# Patient Record
Sex: FEMALE | Race: WHITE | Employment: OTHER | URBAN - METROPOLITAN AREA
[De-identification: names, ages, dates, MRNs, and addresses within clinical notes are randomized per-mention and may not be internally consistent; named-entity substitution may affect disease eponyms.]

---

## 2022-10-11 ENCOUNTER — HOSPITAL ENCOUNTER (INPATIENT)
Age: 82
LOS: 3 days | Discharge: HOME HEALTH | DRG: 690 | End: 2022-10-14
Attending: STUDENT IN AN ORGANIZED HEALTH CARE EDUCATION/TRAINING PROGRAM | Admitting: INTERNAL MEDICINE
Payer: MEDICARE

## 2022-10-11 ENCOUNTER — APPOINTMENT (OUTPATIENT)
Dept: GENERAL RADIOLOGY | Age: 82
DRG: 690 | End: 2022-10-11
Payer: MEDICARE

## 2022-10-11 ENCOUNTER — APPOINTMENT (OUTPATIENT)
Dept: CT IMAGING | Age: 82
DRG: 690 | End: 2022-10-11
Payer: MEDICARE

## 2022-10-11 DIAGNOSIS — E87.6 HYPOKALEMIA: ICD-10-CM

## 2022-10-11 DIAGNOSIS — R55 SYNCOPE AND COLLAPSE: ICD-10-CM

## 2022-10-11 DIAGNOSIS — N39.0 URINARY TRACT INFECTION WITHOUT HEMATURIA, SITE UNSPECIFIED: Primary | ICD-10-CM

## 2022-10-11 DIAGNOSIS — I21.4 NSTEMI (NON-ST ELEVATED MYOCARDIAL INFARCTION) (HCC): ICD-10-CM

## 2022-10-11 DIAGNOSIS — I44.7 LBBB (LEFT BUNDLE BRANCH BLOCK): ICD-10-CM

## 2022-10-11 LAB
A/G RATIO: 1.4 (ref 1.1–2.2)
ALBUMIN SERPL-MCNC: 4 G/DL (ref 3.4–5)
ALP BLD-CCNC: 82 U/L (ref 40–129)
ALT SERPL-CCNC: 53 U/L (ref 10–40)
ANION GAP SERPL CALCULATED.3IONS-SCNC: 11 MMOL/L (ref 3–16)
AST SERPL-CCNC: 31 U/L (ref 15–37)
BACTERIA: ABNORMAL /HPF
BASOPHILS ABSOLUTE: 0 K/UL (ref 0–0.2)
BASOPHILS RELATIVE PERCENT: 0.7 %
BILIRUB SERPL-MCNC: 0.7 MG/DL (ref 0–1)
BILIRUBIN URINE: NEGATIVE
BLOOD, URINE: NEGATIVE
BUN BLDV-MCNC: 16 MG/DL (ref 7–20)
CALCIUM SERPL-MCNC: 9.4 MG/DL (ref 8.3–10.6)
CHLORIDE BLD-SCNC: 99 MMOL/L (ref 99–110)
CHP ED QC CHECK: NORMAL
CLARITY: CLEAR
CO2: 25 MMOL/L (ref 21–32)
COLOR: YELLOW
CREAT SERPL-MCNC: 0.6 MG/DL (ref 0.6–1.2)
EKG ATRIAL RATE: 91 BPM
EKG DIAGNOSIS: NORMAL
EKG P AXIS: 54 DEGREES
EKG P-R INTERVAL: 178 MS
EKG Q-T INTERVAL: 398 MS
EKG QRS DURATION: 122 MS
EKG QTC CALCULATION (BAZETT): 489 MS
EKG R AXIS: -52 DEGREES
EKG T AXIS: 95 DEGREES
EKG VENTRICULAR RATE: 91 BPM
EOSINOPHILS ABSOLUTE: 0.1 K/UL (ref 0–0.6)
EOSINOPHILS RELATIVE PERCENT: 1 %
GFR AFRICAN AMERICAN: >60
GFR NON-AFRICAN AMERICAN: >60
GLUCOSE BLD-MCNC: 114 MG/DL
GLUCOSE BLD-MCNC: 114 MG/DL (ref 70–99)
GLUCOSE BLD-MCNC: 115 MG/DL (ref 70–99)
GLUCOSE URINE: NEGATIVE MG/DL
HCT VFR BLD CALC: 38.9 % (ref 36–48)
HEMOGLOBIN: 13 G/DL (ref 12–16)
KETONES, URINE: NEGATIVE MG/DL
LEUKOCYTE ESTERASE, URINE: ABNORMAL
LYMPHOCYTES ABSOLUTE: 1.8 K/UL (ref 1–5.1)
LYMPHOCYTES RELATIVE PERCENT: 28.3 %
MAGNESIUM: 1.5 MG/DL (ref 1.8–2.4)
MCH RBC QN AUTO: 32 PG (ref 26–34)
MCHC RBC AUTO-ENTMCNC: 33.3 G/DL (ref 31–36)
MCV RBC AUTO: 96.1 FL (ref 80–100)
MICROSCOPIC EXAMINATION: YES
MONOCYTES ABSOLUTE: 0.5 K/UL (ref 0–1.3)
MONOCYTES RELATIVE PERCENT: 8.4 %
NEUTROPHILS ABSOLUTE: 3.8 K/UL (ref 1.7–7.7)
NEUTROPHILS RELATIVE PERCENT: 61.6 %
NITRITE, URINE: POSITIVE
PDW BLD-RTO: 14.1 % (ref 12.4–15.4)
PERFORMED ON: ABNORMAL
PH UA: 6.5 (ref 5–8)
PLATELET # BLD: 242 K/UL (ref 135–450)
PMV BLD AUTO: 8.7 FL (ref 5–10.5)
POTASSIUM REFLEX MAGNESIUM: 3.3 MMOL/L (ref 3.5–5.1)
PROTEIN UA: NEGATIVE MG/DL
RBC # BLD: 4.05 M/UL (ref 4–5.2)
RBC UA: ABNORMAL /HPF (ref 0–4)
SODIUM BLD-SCNC: 135 MMOL/L (ref 136–145)
SPECIFIC GRAVITY UA: 1.02 (ref 1–1.03)
TOTAL PROTEIN: 6.8 G/DL (ref 6.4–8.2)
TROPONIN: 0.03 NG/ML
TROPONIN: 0.03 NG/ML
URINE REFLEX TO CULTURE: ABNORMAL
URINE TYPE: ABNORMAL
UROBILINOGEN, URINE: 2 E.U./DL
WBC # BLD: 6.2 K/UL (ref 4–11)
WBC UA: ABNORMAL /HPF (ref 0–5)

## 2022-10-11 PROCEDURE — 83735 ASSAY OF MAGNESIUM: CPT

## 2022-10-11 PROCEDURE — 85025 COMPLETE CBC W/AUTO DIFF WBC: CPT

## 2022-10-11 PROCEDURE — 6360000002 HC RX W HCPCS: Performed by: STUDENT IN AN ORGANIZED HEALTH CARE EDUCATION/TRAINING PROGRAM

## 2022-10-11 PROCEDURE — 71045 X-RAY EXAM CHEST 1 VIEW: CPT

## 2022-10-11 PROCEDURE — 96367 TX/PROPH/DG ADDL SEQ IV INF: CPT

## 2022-10-11 PROCEDURE — 2060000000 HC ICU INTERMEDIATE R&B

## 2022-10-11 PROCEDURE — 93005 ELECTROCARDIOGRAM TRACING: CPT | Performed by: STUDENT IN AN ORGANIZED HEALTH CARE EDUCATION/TRAINING PROGRAM

## 2022-10-11 PROCEDURE — 70450 CT HEAD/BRAIN W/O DYE: CPT

## 2022-10-11 PROCEDURE — 84484 ASSAY OF TROPONIN QUANT: CPT

## 2022-10-11 PROCEDURE — 96365 THER/PROPH/DIAG IV INF INIT: CPT

## 2022-10-11 PROCEDURE — 99285 EMERGENCY DEPT VISIT HI MDM: CPT

## 2022-10-11 PROCEDURE — 72170 X-RAY EXAM OF PELVIS: CPT

## 2022-10-11 PROCEDURE — 6370000000 HC RX 637 (ALT 250 FOR IP): Performed by: STUDENT IN AN ORGANIZED HEALTH CARE EDUCATION/TRAINING PROGRAM

## 2022-10-11 PROCEDURE — 80053 COMPREHEN METABOLIC PANEL: CPT

## 2022-10-11 PROCEDURE — 2580000003 HC RX 258: Performed by: STUDENT IN AN ORGANIZED HEALTH CARE EDUCATION/TRAINING PROGRAM

## 2022-10-11 PROCEDURE — 81001 URINALYSIS AUTO W/SCOPE: CPT

## 2022-10-11 PROCEDURE — 36415 COLL VENOUS BLD VENIPUNCTURE: CPT

## 2022-10-11 RX ORDER — ACETAMINOPHEN 650 MG/1
650 SUPPOSITORY RECTAL EVERY 6 HOURS PRN
Status: DISCONTINUED | OUTPATIENT
Start: 2022-10-11 | End: 2022-10-14 | Stop reason: HOSPADM

## 2022-10-11 RX ORDER — MAGNESIUM SULFATE IN WATER 40 MG/ML
2000 INJECTION, SOLUTION INTRAVENOUS PRN
Status: DISCONTINUED | OUTPATIENT
Start: 2022-10-11 | End: 2022-10-14 | Stop reason: HOSPADM

## 2022-10-11 RX ORDER — ATORVASTATIN CALCIUM 40 MG/1
40 TABLET, FILM COATED ORAL NIGHTLY
COMMUNITY
End: 2022-10-14 | Stop reason: SDUPTHER

## 2022-10-11 RX ORDER — POTASSIUM CHLORIDE 7.45 MG/ML
10 INJECTION INTRAVENOUS PRN
Status: DISCONTINUED | OUTPATIENT
Start: 2022-10-11 | End: 2022-10-14 | Stop reason: HOSPADM

## 2022-10-11 RX ORDER — ONDANSETRON 2 MG/ML
4 INJECTION INTRAMUSCULAR; INTRAVENOUS EVERY 6 HOURS PRN
Status: DISCONTINUED | OUTPATIENT
Start: 2022-10-11 | End: 2022-10-14 | Stop reason: HOSPADM

## 2022-10-11 RX ORDER — SODIUM CHLORIDE 9 MG/ML
INJECTION, SOLUTION INTRAVENOUS PRN
Status: DISCONTINUED | OUTPATIENT
Start: 2022-10-11 | End: 2022-10-14 | Stop reason: HOSPADM

## 2022-10-11 RX ORDER — SODIUM CHLORIDE 0.9 % (FLUSH) 0.9 %
5-40 SYRINGE (ML) INJECTION PRN
Status: DISCONTINUED | OUTPATIENT
Start: 2022-10-11 | End: 2022-10-14 | Stop reason: HOSPADM

## 2022-10-11 RX ORDER — SODIUM CHLORIDE 0.9 % (FLUSH) 0.9 %
5-40 SYRINGE (ML) INJECTION EVERY 12 HOURS SCHEDULED
Status: DISCONTINUED | OUTPATIENT
Start: 2022-10-11 | End: 2022-10-14 | Stop reason: HOSPADM

## 2022-10-11 RX ORDER — ENOXAPARIN SODIUM 100 MG/ML
40 INJECTION SUBCUTANEOUS DAILY
Status: DISCONTINUED | OUTPATIENT
Start: 2022-10-12 | End: 2022-10-14 | Stop reason: HOSPADM

## 2022-10-11 RX ORDER — MAGNESIUM SULFATE IN WATER 40 MG/ML
2000 INJECTION, SOLUTION INTRAVENOUS ONCE
Status: COMPLETED | OUTPATIENT
Start: 2022-10-11 | End: 2022-10-11

## 2022-10-11 RX ORDER — POTASSIUM CHLORIDE 20 MEQ/1
40 TABLET, EXTENDED RELEASE ORAL ONCE
Status: COMPLETED | OUTPATIENT
Start: 2022-10-11 | End: 2022-10-11

## 2022-10-11 RX ORDER — ACETAMINOPHEN 325 MG/1
650 TABLET ORAL EVERY 6 HOURS PRN
Status: DISCONTINUED | OUTPATIENT
Start: 2022-10-11 | End: 2022-10-14 | Stop reason: HOSPADM

## 2022-10-11 RX ORDER — ONDANSETRON 4 MG/1
4 TABLET, ORALLY DISINTEGRATING ORAL EVERY 8 HOURS PRN
Status: DISCONTINUED | OUTPATIENT
Start: 2022-10-11 | End: 2022-10-14 | Stop reason: HOSPADM

## 2022-10-11 RX ORDER — POTASSIUM CHLORIDE 20 MEQ/1
40 TABLET, EXTENDED RELEASE ORAL PRN
Status: DISCONTINUED | OUTPATIENT
Start: 2022-10-11 | End: 2022-10-14 | Stop reason: HOSPADM

## 2022-10-11 RX ORDER — AMLODIPINE BESYLATE 10 MG/1
10 TABLET ORAL DAILY
COMMUNITY
End: 2022-10-14 | Stop reason: SDUPTHER

## 2022-10-11 RX ORDER — POLYETHYLENE GLYCOL 3350 17 G/17G
17 POWDER, FOR SOLUTION ORAL DAILY PRN
Status: DISCONTINUED | OUTPATIENT
Start: 2022-10-11 | End: 2022-10-14 | Stop reason: HOSPADM

## 2022-10-11 RX ADMIN — POTASSIUM CHLORIDE 40 MEQ: 1500 TABLET, EXTENDED RELEASE ORAL at 20:44

## 2022-10-11 RX ADMIN — DEXTROSE MONOHYDRATE 1000 MG: 5 INJECTION INTRAVENOUS at 17:18

## 2022-10-11 RX ADMIN — MAGNESIUM SULFATE HEPTAHYDRATE 2000 MG: 40 INJECTION, SOLUTION INTRAVENOUS at 20:45

## 2022-10-11 ASSESSMENT — ENCOUNTER SYMPTOMS
CHEST TIGHTNESS: 0
NAUSEA: 0
SORE THROAT: 0
SHORTNESS OF BREATH: 0
VOMITING: 0
ABDOMINAL PAIN: 0

## 2022-10-11 ASSESSMENT — PAIN - FUNCTIONAL ASSESSMENT
PAIN_FUNCTIONAL_ASSESSMENT: NONE - DENIES PAIN
PAIN_FUNCTIONAL_ASSESSMENT: NONE - DENIES PAIN

## 2022-10-11 NOTE — ED TRIAGE NOTES
Pt presents to ED via EMS for repeated falls at [facility]. Per nursing staff, pt was dizzy upon standing and has had 2 falls in the past 3 days.

## 2022-10-11 NOTE — ED PROVIDER NOTES
[address] [facility]          ATTENDING PHYSICIAN NOTE       Date of evaluation: 10/11/2022    Chief Complaint     Fall (Per [facility], falls x2 over the past 3 days. )      History of Present Illness     Zeferino Oshea is a 80 y.o. female with a history of dementia, hypothyroidism who presents to the ED today for evaluation of falls. The patient states that she has had 2 falls in the past 24 hours with an episode of syncope noted on her first fall. On the most recent fall the patient did not hit her head. She is not on any blood thinners. She denies any chest pain, shortness of breath, abdominal pain, back pain. The patient is under an involuntary hold at this time from [facility]. She states that she has noticed that she is having some issues with her gait and has been tripping as well. She has had a history of previous urinary tract infections. She does state that she has generalized malaise. She has not had any vomiting. Looking at her outpatient labs he does not appear that she is on any anticoagulant medications. Review of Systems     Review of Systems   Constitutional:  Positive for fatigue. Negative for chills and fever. HENT:  Negative for congestion and sore throat. Respiratory:  Negative for chest tightness and shortness of breath. Cardiovascular:  Negative for chest pain and leg swelling. Gastrointestinal:  Negative for abdominal pain, nausea and vomiting. Genitourinary:  Negative for difficulty urinating, flank pain and pelvic pain. Musculoskeletal:  Negative for gait problem and neck pain. Skin:  Negative for wound. Neurological:  Negative for dizziness, syncope, weakness and light-headedness. Hematological:  Negative for adenopathy. Psychiatric/Behavioral:  Negative for confusion.       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 Past surgical history: No known at this time    Medications     Previous Medications    No medications on file       Allergies     She has No Known Allergies. Physical Exam     INITIAL VITALS: BP: 121/64, Temp: 98.2 °F (36.8 °C), Heart Rate: 93, Resp: 14, SpO2: 92 %   Physical Exam  Vitals and nursing note reviewed. Constitutional:       Appearance: Normal appearance. She is well-developed. HENT:      Head: Normocephalic and atraumatic. Eyes:      Pupils: Pupils are equal, round, and reactive to light. Cardiovascular:      Rate and Rhythm: Normal rate and regular rhythm. Pulmonary:      Effort: Pulmonary effort is normal.      Breath sounds: Normal breath sounds. Abdominal:      General: Abdomen is flat. Palpations: Abdomen is soft. Musculoskeletal:         General: No swelling, tenderness, deformity or signs of injury. Normal range of motion. Cervical back: Neck supple. Right lower leg: No edema. Left lower leg: No edema. Skin:     General: Skin is warm and dry. Capillary Refill: Capillary refill takes less than 2 seconds. Findings: No erythema. Neurological:      Mental Status: She is alert and oriented to person, place, and time. Cranial Nerves: No cranial nerve deficit. Coordination: Coordination normal.       Diagnostic Results     EKG   Indication syncope. Heart rate 91. Normal sinus rhythm with left bundle branch block. QTc 489 ms. Left axis deviation. No previous EKG available. RADIOLOGY:  XR PELVIS (1-2 VIEWS)   Final Result      Mild bilateral hip osteoarthrosis with no acute osseous abnormality. Lower lumbar spondylosis. XR CHEST 1 VIEW   Final Result      No acute radiographic abnormality of the chest.      CT Head W/O Contrast   Final Result      No acute intracranial hemorrhage or mass effect. Mild chronic small vessel ischemic change.           LABS: Results for orders placed or performed during the hospital encounter of 10/11/22   CBC with Auto Differential   Result Value Ref Range    WBC 6.2 4.0 - 11.0 K/uL    RBC 4.05 4.00 - 5.20 M/uL    Hemoglobin 13.0 12.0 - 16.0 g/dL    Hematocrit 38.9 36.0 - 48.0 %    MCV 96.1 80.0 - 100.0 fL    MCH 32.0 26.0 - 34.0 pg    MCHC 33.3 31.0 - 36.0 g/dL    RDW 14.1 12.4 - 15.4 %    Platelets 968 780 - 379 K/uL    MPV 8.7 5.0 - 10.5 fL    Neutrophils % 61.6 %    Lymphocytes % 28.3 %    Monocytes % 8.4 %    Eosinophils % 1.0 %    Basophils % 0.7 %    Neutrophils Absolute 3.8 1.7 - 7.7 K/uL    Lymphocytes Absolute 1.8 1.0 - 5.1 K/uL    Monocytes Absolute 0.5 0.0 - 1.3 K/uL    Eosinophils Absolute 0.1 0.0 - 0.6 K/uL    Basophils Absolute 0.0 0.0 - 0.2 K/uL   CMP w/ Reflex to MG   Result Value Ref Range    Sodium 135 (L) 136 - 145 mmol/L    Potassium reflex Magnesium 3.3 (L) 3.5 - 5.1 mmol/L    Chloride 99 99 - 110 mmol/L    CO2 25 21 - 32 mmol/L    Anion Gap 11 3 - 16    Glucose 115 (H) 70 - 99 mg/dL    BUN 16 7 - 20 mg/dL    Creatinine 0.6 0.6 - 1.2 mg/dL    GFR Non-African American >60 >60    GFR African American >60 >60    Calcium 9.4 8.3 - 10.6 mg/dL    Total Protein 6.8 6.4 - 8.2 g/dL    Albumin 4.0 3.4 - 5.0 g/dL    Albumin/Globulin Ratio 1.4 1.1 - 2.2    Total Bilirubin 0.7 0.0 - 1.0 mg/dL    Alkaline Phosphatase 82 40 - 129 U/L    ALT 53 (H) 10 - 40 U/L    AST 31 15 - 37 U/L   Troponin   Result Value Ref Range    Troponin 0.03 (H) <0.01 ng/mL   Urinalysis with Reflex to Culture    Specimen: Urine   Result Value Ref Range    Color, UA Yellow Straw/Yellow    Clarity, UA Clear Clear    Glucose, Ur Negative Negative mg/dL    Bilirubin Urine Negative Negative    Ketones, Urine Negative Negative mg/dL    Specific Gravity, UA 1.020 1.005 - 1.030    Blood, Urine Negative Negative    pH, UA 6.5 5.0 - 8.0    Protein, UA Negative Negative mg/dL    Urobilinogen, Urine 2.0 (A) <2.0 E.U./dL    Nitrite, Urine POSITIVE (A) Negative Leukocyte Esterase, Urine SMALL (A) Negative    Microscopic Examination YES     Urine Type Voided     Urine Reflex to Culture Not Indicated    Magnesium   Result Value Ref Range    Magnesium 1.50 (L) 1.80 - 2.40 mg/dL   Troponin   Result Value Ref Range    Troponin 0.03 (H) <0.01 ng/mL   Microscopic Urinalysis   Result Value Ref Range    WBC, UA 6-9 (A) 0 - 5 /HPF    RBC, UA 0-2 0 - 4 /HPF    Bacteria, UA 4+ (A) None Seen /HPF   POCT Glucose   Result Value Ref Range    Glucose 114 mg/dL    QC OK? ok    POCT Glucose   Result Value Ref Range    POC Glucose 114 (H) 70 - 99 mg/dl    Performed on ACCU-CHEK    EKG 12 Lead   Result Value Ref Range    Ventricular Rate 91 BPM    Atrial Rate 91 BPM    P-R Interval 178 ms    QRS Duration 122 ms    Q-T Interval 398 ms    QTc Calculation (Bazett) 489 ms    P Axis 54 degrees    R Axis -52 degrees    T Axis 95 degrees    Diagnosis       EKG performed in ER and to be interpreted by ER physician. Confirmed by MD, ER ([id number]),  Chalo Trinh ([id number]) on 10/11/2022 4:37:37 PM       ED BEDSIDE ULTRASOUND:  No results found. RECENT VITALS:  BP: 134/75,Temp: 97.7 °F (36.5 °C), Heart Rate: 93, Resp: 15, SpO2: 96 %     Procedures       ED Course     Nursing Notes, Past Medical Hx, Past Surgical Hx, Social Hx,Allergies, and Family Hx were reviewed. Looking at previous notes it appears the patient was admitted 4 days ago to [facility] at that time she was found wandering away from home and was having some paranoia. She had had some suicidal ideation at that time. She came through [facility]. ED Course as of 10/11/22 2122   Tue Oct 11, 2022   1515 Patient's cross-sectional imaging studies at this time otherwise unremarkable. This included CT scan of her head and her EKG which here shows left bundle branch block with a left axis deviation. No criteria for underlying myocardial injury.  [EI]   [phone] I was able to get a hold of the facility and they stated the patient had a mechanical fall today landing on her buttocks. She was complaining of some pain in her pelvis. They had just recently obtained a urinalysis yesterday which was pending at this time. [EI]   0295 The patient's pelvis at this time shows no evidence of any fractures aside from some osteoarthritis. [EI]   1721 She had no previous EKG for us to compare to here. Her initial troponin was 0.03 which is unchanged on repeat. She was noted to have a urinary tract infection was given a dose of Rocephin here in the department. Her renal panel shows some mild hypokalemia as well and no evidence of any TIMOTHY. [EI]      ED Course User Index  [EI] Nicole Rdz MD       patient was given the following medications:  Orders Placed This Encounter   Medications    cefTRIAXone (ROCEPHIN) 1,000 mg in dextrose 5 % 50 mL IVPB mini-bag     Order Specific Question:   Antimicrobial Indications     Answer:   Urinary Tract Infection    potassium chloride (KLOR-CON M) extended release tablet 40 mEq    magnesium sulfate 2000 mg in 50 mL IVPB premix       CONSULTS:  None    MEDICAL DECISIONMAKING / ASSESSMENT / Nazia Katelin is a 80 y.o. female with a history of heart murmur of unknown etiology, hypertension, hypothyroidism who had an episode of syncope and is also noted to have a urinary tract infection here in the department. Patient has troponin of 0.03 that is unchanged on repeat but given her constellation of symptoms will be admitted for cardiac work-up and treatment for urinary tract infection. Clinical Impression     1. Urinary tract infection without hematuria, site unspecified    2. NSTEMI (non-ST elevated myocardial infarction) ([facility] [address]    3. Syncope and collapse    4. LBBB (left bundle branch block)    5. Hypokalemia        Disposition     PATIENT REFERRED TO:  No follow-up provider specified.     DISCHARGE MEDICATIONS:  New Prescriptions    No medications on file       DISPOSITION Decision To Admit 10/11/2022 05:18:23 PM         Cyrus Medel MD  10/11/22 212

## 2022-10-12 PROBLEM — I44.7 LBBB (LEFT BUNDLE BRANCH BLOCK): Status: ACTIVE | Noted: 2022-10-12
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
## 2022-10-12 LAB
ANION GAP SERPL CALCULATED.3IONS-SCNC: 11 MMOL/L (ref 3–16)
BASOPHILS ABSOLUTE: 0 K/UL (ref 0–0.2)
BASOPHILS RELATIVE PERCENT: 0.7 %
BUN BLDV-MCNC: 13 MG/DL (ref 7–20)
CALCIUM SERPL-MCNC: 9.7 MG/DL (ref 8.3–10.6)
CHLORIDE BLD-SCNC: 99 MMOL/L (ref 99–110)
CO2: 25 MMOL/L (ref 21–32)
CREAT SERPL-MCNC: 0.5 MG/DL (ref 0.6–1.2)
EOSINOPHILS ABSOLUTE: 0.1 K/UL (ref 0–0.6)
EOSINOPHILS RELATIVE PERCENT: 1 %
GFR AFRICAN AMERICAN: >60
GFR NON-AFRICAN AMERICAN: >60
GLUCOSE BLD-MCNC: 108 MG/DL (ref 70–99)
HCT VFR BLD CALC: 38 % (ref 36–48)
HEMOGLOBIN: 12.9 G/DL (ref 12–16)
LYMPHOCYTES ABSOLUTE: 1.3 K/UL (ref 1–5.1)
LYMPHOCYTES RELATIVE PERCENT: 19.5 %
MCH RBC QN AUTO: 32.3 PG (ref 26–34)
MCHC RBC AUTO-ENTMCNC: 33.9 G/DL (ref 31–36)
MCV RBC AUTO: 95.3 FL (ref 80–100)
MONOCYTES ABSOLUTE: 0.5 K/UL (ref 0–1.3)
MONOCYTES RELATIVE PERCENT: 8 %
NEUTROPHILS ABSOLUTE: 4.8 K/UL (ref 1.7–7.7)
NEUTROPHILS RELATIVE PERCENT: 70.8 %
PDW BLD-RTO: 13.7 % (ref 12.4–15.4)
PLATELET # BLD: 223 K/UL (ref 135–450)
PMV BLD AUTO: 9 FL (ref 5–10.5)
POTASSIUM REFLEX MAGNESIUM: 4 MMOL/L (ref 3.5–5.1)
RBC # BLD: 3.99 M/UL (ref 4–5.2)
SODIUM BLD-SCNC: 135 MMOL/L (ref 136–145)
TROPONIN: 0.01 NG/ML
TROPONIN: 0.02 NG/ML
WBC # BLD: 6.8 K/UL (ref 4–11)

## 2022-10-12 PROCEDURE — 2580000003 HC RX 258: Performed by: INTERNAL MEDICINE

## 2022-10-12 PROCEDURE — 97116 GAIT TRAINING THERAPY: CPT

## 2022-10-12 PROCEDURE — 6370000000 HC RX 637 (ALT 250 FOR IP): Performed by: INTERNAL MEDICINE

## 2022-10-12 PROCEDURE — 85025 COMPLETE CBC W/AUTO DIFF WBC: CPT

## 2022-10-12 PROCEDURE — 93005 ELECTROCARDIOGRAM TRACING: CPT | Performed by: INTERNAL MEDICINE

## 2022-10-12 PROCEDURE — 6360000002 HC RX W HCPCS: Performed by: INTERNAL MEDICINE

## 2022-10-12 PROCEDURE — 97530 THERAPEUTIC ACTIVITIES: CPT

## 2022-10-12 PROCEDURE — 36415 COLL VENOUS BLD VENIPUNCTURE: CPT

## 2022-10-12 PROCEDURE — 97166 OT EVAL MOD COMPLEX 45 MIN: CPT

## 2022-10-12 PROCEDURE — 2060000000 HC ICU INTERMEDIATE R&B

## 2022-10-12 PROCEDURE — 80048 BASIC METABOLIC PNL TOTAL CA: CPT

## 2022-10-12 PROCEDURE — 97162 PT EVAL MOD COMPLEX 30 MIN: CPT

## 2022-10-12 PROCEDURE — 84484 ASSAY OF TROPONIN QUANT: CPT

## 2022-10-12 PROCEDURE — 99221 1ST HOSP IP/OBS SF/LOW 40: CPT | Performed by: INTERNAL MEDICINE

## 2022-10-12 PROCEDURE — 97535 SELF CARE MNGMENT TRAINING: CPT

## 2022-10-12 RX ORDER — LABETALOL 200 MG/1
100 TABLET, FILM COATED ORAL 2 TIMES DAILY
COMMUNITY
End: 2022-10-14 | Stop reason: SDUPTHER

## 2022-10-12 RX ORDER — DULOXETIN HYDROCHLORIDE 30 MG/1
30 CAPSULE, DELAYED RELEASE ORAL EVERY EVENING
COMMUNITY
End: 2022-10-14 | Stop reason: SDUPTHER

## 2022-10-12 RX ORDER — LEVOTHYROXINE SODIUM 0.05 MG/1
50 TABLET ORAL DAILY
Status: DISCONTINUED | OUTPATIENT
Start: 2022-10-12 | End: 2022-10-14 | Stop reason: HOSPADM

## 2022-10-12 RX ORDER — POTASSIUM CHLORIDE 20 MEQ/1
20 TABLET, EXTENDED RELEASE ORAL 2 TIMES DAILY
Status: ON HOLD | COMMUNITY
Start: 2022-10-07 | End: 2022-10-14 | Stop reason: HOSPADM

## 2022-10-12 RX ORDER — RISPERIDONE 0.25 MG/1
0.25 TABLET, FILM COATED ORAL 2 TIMES DAILY
Status: ON HOLD | COMMUNITY
End: 2022-10-14 | Stop reason: HOSPADM

## 2022-10-12 RX ORDER — TRAZODONE HYDROCHLORIDE 50 MG/1
25 TABLET ORAL
Status: DISCONTINUED | OUTPATIENT
Start: 2022-10-12 | End: 2022-10-14 | Stop reason: HOSPADM

## 2022-10-12 RX ORDER — AMLODIPINE BESYLATE 10 MG/1
10 TABLET ORAL DAILY
Status: DISCONTINUED | OUTPATIENT
Start: 2022-10-12 | End: 2022-10-14 | Stop reason: HOSPADM

## 2022-10-12 RX ORDER — MAGNESIUM HYDROXIDE/ALUMINUM HYDROXICE/SIMETHICONE 120; 1200; 1200 MG/30ML; MG/30ML; MG/30ML
30 SUSPENSION ORAL EVERY 4 HOURS PRN
COMMUNITY
End: 2022-10-14 | Stop reason: SDUPTHER

## 2022-10-12 RX ORDER — HYDROCHLOROTHIAZIDE 25 MG/1
25 TABLET ORAL DAILY
Status: ON HOLD | COMMUNITY
End: 2022-10-14 | Stop reason: HOSPADM

## 2022-10-12 RX ORDER — ACETAMINOPHEN 325 MG/1
650 TABLET ORAL EVERY 6 HOURS PRN
COMMUNITY

## 2022-10-12 RX ORDER — LABETALOL 100 MG/1
100 TABLET, FILM COATED ORAL 2 TIMES DAILY
Status: DISCONTINUED | OUTPATIENT
Start: 2022-10-12 | End: 2022-10-14 | Stop reason: HOSPADM

## 2022-10-12 RX ORDER — TRAZODONE HYDROCHLORIDE 50 MG/1
25 TABLET ORAL
COMMUNITY
End: 2022-10-14 | Stop reason: SDUPTHER

## 2022-10-12 RX ORDER — RISPERIDONE 0.25 MG/1
0.25 TABLET, FILM COATED ORAL 2 TIMES DAILY
Status: DISCONTINUED | OUTPATIENT
Start: 2022-10-12 | End: 2022-10-14 | Stop reason: HOSPADM

## 2022-10-12 RX ORDER — DULOXETIN HYDROCHLORIDE 30 MG/1
30 CAPSULE, DELAYED RELEASE ORAL EVERY EVENING
Status: DISCONTINUED | OUTPATIENT
Start: 2022-10-12 | End: 2022-10-14 | Stop reason: HOSPADM

## 2022-10-12 RX ORDER — ACETAMINOPHEN 325 MG/1
650 TABLET ORAL EVERY 6 HOURS PRN
Status: DISCONTINUED | OUTPATIENT
Start: 2022-10-12 | End: 2022-10-12 | Stop reason: SDUPTHER

## 2022-10-12 RX ORDER — ATORVASTATIN CALCIUM 40 MG/1
40 TABLET, FILM COATED ORAL NIGHTLY
Status: DISCONTINUED | OUTPATIENT
Start: 2022-10-12 | End: 2022-10-14 | Stop reason: HOSPADM

## 2022-10-12 RX ORDER — LISINOPRIL 40 MG/1
40 TABLET ORAL DAILY
COMMUNITY
End: 2022-10-14 | Stop reason: SDUPTHER

## 2022-10-12 RX ORDER — LISINOPRIL 40 MG/1
40 TABLET ORAL DAILY
Status: DISCONTINUED | OUTPATIENT
Start: 2022-10-12 | End: 2022-10-14 | Stop reason: HOSPADM

## 2022-10-12 RX ORDER — TERAZOSIN 5 MG/1
10 CAPSULE ORAL DAILY
Status: ON HOLD | COMMUNITY
End: 2022-10-14 | Stop reason: HOSPADM

## 2022-10-12 RX ORDER — LEVOTHYROXINE SODIUM 0.05 MG/1
50 TABLET ORAL DAILY
COMMUNITY
End: 2022-10-14 | Stop reason: SDUPTHER

## 2022-10-12 RX ADMIN — DULOXETINE HYDROCHLORIDE 30 MG: 30 CAPSULE, DELAYED RELEASE ORAL at 17:08

## 2022-10-12 RX ADMIN — SODIUM CHLORIDE, PRESERVATIVE FREE 10 ML: 5 INJECTION INTRAVENOUS at 00:21

## 2022-10-12 RX ADMIN — LABETALOL HYDROCHLORIDE 100 MG: 100 TABLET, FILM COATED ORAL at 21:11

## 2022-10-12 RX ADMIN — SODIUM CHLORIDE, PRESERVATIVE FREE 10 ML: 5 INJECTION INTRAVENOUS at 21:10

## 2022-10-12 RX ADMIN — LEVOTHYROXINE SODIUM 50 MCG: 50 TABLET ORAL at 15:13

## 2022-10-12 RX ADMIN — DEXTROSE MONOHYDRATE 1000 MG: 5 INJECTION INTRAVENOUS at 17:08

## 2022-10-12 RX ADMIN — AMLODIPINE BESYLATE 10 MG: 10 TABLET ORAL at 15:14

## 2022-10-12 RX ADMIN — SODIUM CHLORIDE, PRESERVATIVE FREE 10 ML: 5 INJECTION INTRAVENOUS at 11:05

## 2022-10-12 RX ADMIN — ATORVASTATIN CALCIUM 40 MG: 40 TABLET, FILM COATED ORAL at 21:11

## 2022-10-12 RX ADMIN — ENOXAPARIN SODIUM 40 MG: 100 INJECTION SUBCUTANEOUS at 11:05

## 2022-10-12 ASSESSMENT — PAIN SCALES - GENERAL
PAINLEVEL_OUTOF10: 0

## 2022-10-12 NOTE — PROGRESS NOTES
Hospital Medicine Care  Progress Note      Chief complain; Fall (Per [facility], falls x2 over the past 3 days. )  80-year-old female with a past medical history of dementia, recently admitted to inpatient psych facility [facility]), presented with syncopal event         Subjective:     Patient able to state her name and date of birth  Denies any chest pain, shortness of breath  Denies any lightheadedness  Disoriented      Review of Systems:     Review of Systems as mentioned above, other ros is negative. Objective:       Medications        Scheduled Meds:   sodium chloride flush  5-40 mL IntraVENous 2 times per day    enoxaparin  40 mg SubCUTAneous Daily    cefTRIAXone (ROCEPHIN) IV  1,000 mg IntraVENous Q24H     Continuous Infusions:   sodium chloride       PRN Meds:.perflutren lipid microspheres, sodium chloride flush, sodium chloride, ondansetron **OR** ondansetron, polyethylene glycol, acetaminophen **OR** acetaminophen, potassium chloride **OR** potassium alternative oral replacement **OR** potassium chloride, magnesium sulfate      Allergies  No Known Allergies      Vitals:    10/12/22 0406 10/12/22 0600 10/12/22 0827 10/12/22 1108   BP: (!) 141/68  104/63 (!) 143/77   Pulse: 79 93 83 90   Resp: 20 20 17 16   Temp: 97.7 °F (36.5 °C)  98 °F (36.7 °C) 97.6 °F (36.4 °C)   TempSrc: Oral  Oral Oral   SpO2: 96%  97% 98%   Weight: 131 lb 2.8 oz (59.5 kg)      Height:             Physical exam:         Physical Exam  Constitutional:       General: She is not in acute distress. Appearance: Normal appearance. She is ill-appearing. HENT:      Head: Normocephalic and atraumatic. Cardiovascular:      Rate and Rhythm: Normal rate and regular rhythm. Pulses: Normal pulses. Heart sounds: Normal heart sounds. Pulmonary:      Effort: Pulmonary effort is normal.      Breath sounds: Normal breath sounds. Skin:     General: Skin is warm and dry. Neurological:      General: No focal deficit present.

## 2022-10-12 NOTE — PROGRESS NOTES
Physical Therapy  Facility/Department: [facility]  Physical Therapy Initial Assessment    Name: Dom Juarez  : 1940  MRN: [id number]  Date of Service: 10/12/2022    Discharge Recommendations:Veronica Gil scored a 20/24 on the AM-PAC short mobility form. Current research shows that an AM-PAC score of 18 or greater is typically associated with a discharge to the patient's home setting. Based on the patient's AM-PAC score and their current functional mobility deficits, it is recommended that the patient have 2-3 sessions per week of Physical Therapy at d/c to increase the patient's independence. At this time, this patient demonstrates the endurance and safety to discharge home with (home vs OP services) and a follow up treatment frequency of 2-3x/wk. Please see assessment section for further patient specific details. If patient discharges prior to next session this note will serve as a discharge summary. Please see below for the latest assessment towards goals. PT Equipment Recommendations  Equipment Needed: No      Patient Diagnosis(es): The primary encounter diagnosis was Urinary tract infection without hematuria, site unspecified. Diagnoses of NSTEMI (non-ST elevated myocardial infarction) ([facility] [address] Syncope and collapse, LBBB (left bundle branch block), and Hypokalemia were also pertinent to this visit. Past Medical History:  has no past medical history on file. Past Surgical History:  has no past surgical history on file. Assessment   Assessment: 79 yo admitted 10/11/22 from [facility]. Pt confused this am and unable to provide history though she was aware she came from another facility. Per notes, pt found wandering away from home and admit to [facility]. Pt moving well but safety concerns for living alone due to long and short term memory deficits. Pt would benefit from 24 hour supervision at discharge, continued PT services.  Recommend nursing continue to encourage ambulation PRN and often while pt in hospital. RN aware. Treatment Diagnosis: mobility impairment due to syncope  Decision Making: High Complexity  Requires PT Follow-Up: Yes  Activity Tolerance  Activity Tolerance: Treatment limited secondary to decreased cognition;Patient tolerated treatment well     Plan   Physcial Therapy Plan  General Plan:  (2-5)  Current Treatment Recommendations: Transfer training, Gait training, Functional mobility training  Safety Devices  Type of Devices: Call light within reach, Chair alarm in place, Left in chair, Nurse notified, Sitter present (pt reclined)     Restrictions  Position Activity Restriction  Other position/activity restrictions: up with assist     Subjective   Pain: denies c/o  General  Additional Pertinent Hx: 80 y.o. female with a history of dementia, hypothyroidism who presents to the ED today for evaluation of falls/syncope. CXR/pelvic CT/head CT neg; cardio consult; positive NSTEMI; UA positive UTI Pmhx: patient was admitted 4 days ago to [facility] at that time she was found wandering away from home and was having some paranoia,  HTN, heart murmur, dementia. Family / Caregiver Present: No  Diagnosis: syncope/falls/UTI  Follows Commands: Within Functional Limits  Subjective  Subjective: Pt found supine in bed and agreeable to PT with encouragement. Pt denies pain. Social/Functional History  Social/Functional History  Lives With: Alone  Type of Home: House (reports she was thrown out of her rental - unsure of timeline)  Ambulation Assistance: Independent  Additional Comments: admitted from [facility] - found wandering in the streets and has been running away from her son. Pt is a poor historian and unable to provide meaningful history.     Vision/Hearing  Vision  Vision: Impaired  Vision Exceptions: Wears glasses at all times  Hearing  Hearing: Within functional limits      Cognition   Orientation  Overall Orientation Status: Impaired  Orientation Level: Oriented to place;Oriented to person  Cognition  Overall Cognitive Status: Exceptions  Arousal/Alertness: Delayed responses to stimuli  Following Commands: Follows one step commands with increased time  Attention Span: Attends with cues to redirect  Memory: Decreased short term memory;Decreased long term memory  Safety Judgement: Decreased awareness of need for assistance;Decreased awareness of need for safety  Problem Solving: Decreased awareness of errors  Insights: Decreased awareness of deficits  Initiation: Requires cues for some  Sequencing: Requires cues for some  Cognition Comment: Pt with flat affect. Objective                 AROM RLE (degrees)  RLE AROM: WFL  AROM LLE (degrees)  LLE AROM : WFL    Strength RLE  Strength RLE: WFL  Strength LLE  Strength LLE: WFL           Bed mobility  Sit to Supine: Supervision  Scooting: Supervision    Transfers  Sit to Stand: Stand by assistance (x3 trials)  Stand to Sit: Stand by assistance (x3 trials)    Ambulation  Device: No Device  Assistance: Contact guard assistance (progressing to SBA)  Quality of Gait: forward posture with decreased step lengt/height; pt reaching out for UE support during first trial then did not need to on subsequent trials  Distance: 10 ft x2, 100 ft  Comments: B/P: supine 127/69, sit 119/58, stand 112/71. RN and MD aware.           AM-PAC Score  AM-PAC Inpatient Mobility Raw Score : 20 (10/12/22 1043)  AM-PAC Inpatient T-Scale Score : 47.67 (10/12/22 1043)  Mobility Inpatient CMS 0-100% Score: 35.83 (10/12/22 1043)  Mobility Inpatient CMS G-Code Modifier : CJ (10/12/22 1043)         Goals  Short Term Goals  Time Frame for Short Term Goals: discharge  Short Term Goal 1: sit to/from stand independent  Short Term Goal 2: ambulate 300 ft independent  Patient Goals   Patient Goals : unable to state       Education  Patient Education  Education Given To: Patient  Education Provided: Role of Therapy  Education Method: Verbal  Barriers to Learning: Cognition  Education Outcome: Unable to verbalize      Therapy Time   Individual Concurrent Group Co-treatment   Time In 0950         Time Out 1030         Minutes 40          Timed Code Treatment Minutes: 30      Total Treatment Minutes:   40       Peng Gilman, PT

## 2022-10-12 NOTE — CARE COORDINATION
Case Management Assessment  Initial Evaluation    Date/Time of Evaluation: 10/12/2022 2:41 PM  Assessment Completed by: Mindy Rutledge RN    If patient is discharged prior to next notation, then this note serves as note for discharge by case management. Patient Name: Jose Connolly                   YOB: 1940  Diagnosis: Syncope and collapse [R55]  Hypokalemia [E87.6]  LBBB (left bundle branch block) [I44.7]  NSTEMI (non-ST elevated myocardial infarction) ([facility] [address] [I21.4]  Urinary tract infection without hematuria, site unspecified [N39.0]  Syncope, unspecified syncope type [R55]                   Date / Time: 10/11/2022  1:52 PM    Patient Admission Status: Inpatient   Readmission Risk (Low < 19, Mod (19-27), High > 27): Readmission Risk Score: 10.7    Current PCP: No primary care provider on file. PCP verified by CM? No    Chart Reviewed: Yes      History Provided by: Child/Family  Patient Orientation: Alert and Oriented, Person    Patient Cognition: Short Term Memory Deficit    Hospitalization in the last 30 days (Readmission):  No    If yes, Readmission Assessment in CM Navigator will be completed. Advance Directives:      Code Status: Full Code   Patient's Primary Decision Maker is:        Discharge Planning:    Patient lives with: Children Type of Home: House  Primary Care Giver: Family  Patient Support Systems include: Children   Current Financial resources:    Current community resources:    Current services prior to admission: Other (Comment) (at [facility])            Current DME:              Type of Home Care services:  None    ADLS  Prior functional level: Independent in ADLs/IADLs  Current functional level: Assistance with the following:    PT AM-PAC: 20 /24  OT AM-PAC: 18 /24    Family can provide assistance at DC: Yes  Would you like Case Management to discuss the discharge plan with any other family members/significant others, and if so, who?  Yes (son Elsy Herrera)  Plans to Return to Present Housing: Unknown at present  Other Identified Issues/Barriers to RETURNING to current housing: yes  Potential Assistance needed at discharge: Home Care            Potential DME:    Patient expects to discharge to: Behavioral Health/Substance/Detox  Plan for transportation at discharge: Family    Financial    Payor: Chencho Berkowitz / Plan: Arlette Mendez ESSENTIAL/PLUS / Product Type: *No Product type* /     Does insurance require precert for SNF: Yes    Potential assistance Purchasing Medications:    Meds-to-Beds request:      No Pharmacies Listed    Notes:    Factors facilitating achievement of predicted outcomes: Family support    Barriers to discharge: Medical complications and Medication managment    Additional Case Management Notes: Pt is from home alone, became depressed and agitated, went to daugther's home [facility]) and then ran away, ended up in hospital in [place]) . From there she was transferred to [facility] and then to [facility] for Syncope. Son states pt was Independent , driving, no DME prior to original hospital admission. Discharge plan [facility] vs home with daughter and Marsha Kessler. The Plan for Transition of Care is related to the following treatment goals of Syncope and collapse [R55]  Hypokalemia [E87.6]  LBBB (left bundle branch block) [I44.7]  NSTEMI (non-ST elevated myocardial infarction) ([facility] [address] [I21.4]  Urinary tract infection without hematuria, site unspecified [N39.0]  Syncope, unspecified syncope type [Q71]    IF APPLICABLE: The Patient and/or patient representative Tiny and her family were provided with a choice of provider and agrees with the discharge plan. Freedom of choice list with basic dialogue that supports the patient's individualized plan of care/goals and shares the quality data associated with the providers was provided to:     Patient Representative Name:       The Patient and/or Patient Representative Agree with the Discharge Plan?       Yolanda Nina RN  Case Management Department  Ph: [phone] Fax: [phone]

## 2022-10-12 NOTE — H&P
Hospital Medicine History & Physical      PCP: No primary care provider on file. Date of Admission: 10/11/2022    Date of Service: Pt seen/examined on 10/11/2022 and Admitted to Inpatient with expected LOS greater than two midnights due to medical therapy. Chief Complaint: Syncope and fall      History Of Present Illness:   80 y.o. female who presents from [facility]/psychiatric facility for further evaluation of falls/syncope. This patient has been admitted to [facility] as she was found wandering in the streets and has been running away from her son. Patient has history of dementia at baseline. Patient has had a fall with loss of consciousness-once yesterday at the facility. Patient had another fall, which is described as nonmechanical per facility staff-once without loss of consciousness. Patient is unable to participate in history giving due to baseline dementia. Patient has a sitter at the bedside as she is prone to wandering. Patient has not had any head injury during these 2 falls as she was held. She does not take any antiplatelets or blood thinners. Patient has history of frequent UTIs in the past    Past Medical History:      No past medical history on file. Unable to obtain due to baseline dementia    Past Surgical History:      No past surgical history on file. Unable to obtain due to baseline dementia    Medications Prior to Admission:      Prior to Admission medications    Not on File       Allergies:  Patient has no known allergies. Social History:      The patient currently lives at [address] [place] [address] [place]:   has no history on file for tobacco use. ETOH:   has no history on file for alcohol use. E-cigarette/Vaping       Questions Responses    E-cigarette/Vaping Use     Start Date     Passive Exposure     Quit Date     Counseling Given     Comments               Family History:    Reviewed and negative in regards to presenting illness/complaint.   Unable to obtain due to baseline dementia    REVIEW OF SYSTEMS COMPLETED:    Patient is unable to provide history due to baseline dementia    PHYSICAL EXAM PERFORMED:    /75   Pulse 93   Temp 97.7 °F (36.5 °C) (Oral)   Resp 15   Ht 5' 2\" (1.575 m)   Wt 137 lb 3.2 oz (62.2 kg)   SpO2 96%   BMI 25.09 kg/m²     General appearance:  No apparent distress, appears stated age and cooperative. HEENT:  Normal cephalic, atraumatic without obvious deformity. Pupils equal, round, and reactive to light. Extra ocular muscles intact. Conjunctivae/corneas clear. Neck: Supple, with full range of motion. No jugular venous distention. Trachea midline. Respiratory:  Normal respiratory effort. Clear to auscultation, bilaterally without Rales/Wheezes/Rhonchi. Cardiovascular:  Regular rate and rhythm with normal S1/S2 without murmurs, rubs or gallops. Abdomen: Soft, non-tender, non-distended with normal bowel sounds. Musculoskeletal:  No clubbing, cyanosis or edema bilaterally. Full range of motion without deformity. Skin: Skin color, texture, turgor normal.  No rashes or lesions. Neurologic: Does not follow commands, moving all 4 extremities spontaneously  Psychiatric: Pleasantly confused, not agitated  Capillary Refill: Brisk,3 seconds, normal  Peripheral Pulses: +2 palpable, equal bilaterally       Labs:     Recent Labs     10/11/22  1529   WBC 6.2   HGB 13.0   HCT 38.9        Recent Labs     10/11/22  1529   *   K 3.3*   CL 99   CO2 25   BUN 16   CREATININE 0.6   CALCIUM 9.4     Recent Labs     10/11/22  1529   AST 31   ALT 53*   BILITOT 0.7   ALKPHOS 82     No results for input(s): INR in the last 72 hours.   Recent Labs     10/11/22  1529 10/11/22  1635   TROPONINI 0.03* 0.03*       Urinalysis:      Lab Results   Component Value Date/Time    NITRU POSITIVE 10/11/2022 04:12 PM    WBCUA 6-9 10/11/2022 04:12 PM    BACTERIA 4+ 10/11/2022 04:12 PM    RBCUA 0-2 10/11/2022 04:12 PM    BLOODU Negative 10/11/2022 04:12 PM    SPECGRAV 1.020 10/11/2022 04:12 PM    GLUCOSEU Negative 10/11/2022 04:12 PM       Radiology:     CXR: I have reviewed the CXR with the following interpretation: As below  EKG:  I have reviewed the EKG with the following interpretation: Sinus rhythm, VR = 91, QTc = 489, LBBB. No previous EKG to compare    XR PELVIS (1-2 VIEWS)   Final Result      Mild bilateral hip osteoarthrosis with no acute osseous abnormality. Lower lumbar spondylosis. XR CHEST 1 VIEW   Final Result      No acute radiographic abnormality of the chest.      CT Head W/O Contrast   Final Result      No acute intracranial hemorrhage or mass effect. Mild chronic small vessel ischemic change. Consults:    IP CONSULT TO PHARMACY  IP CONSULT TO CARDIOLOGY  IP CONSULT TO SOCIAL WORK    ASSESSMENT:    Active Hospital Problems    Diagnosis Date Noted    Syncope, unspecified syncope type [R55] 10/11/2022     Priority: Medium   #Syncope  #Mechanical fall  #UTI  #Electrolyte abnormalities  #Elevated troponin, stable at 0.03x2. EKG with LBBB, no prior EKG to compare. Patient denies chest pain or shortness of breath. 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  -Orthostatic vital signs x2  -Cardiology consulted given syncope, elevated troponin and LBBB on EKG which is unable to confirm with prior EKGs  -Continue on IV antibiotics, follow urine cultures  -Replace electrolytes as ordered, continue to monitor  -Fall precautions  -PT/OT  -Social service consult  -Sitter at bedside    DVT Prophylaxis: Lovenox  Diet: ADULT DIET; Regular  Code Status: Full Code    PT/OT Eval Status: As tolerated with fall precautions    Dispo -GMF with telemetry       Chuy Sánchez MD    Thank you No primary care provider on file.  for the opportunity to be involved in this patient's care. If you have any questions or concerns please feel free to contact me at [phone].

## 2022-10-12 NOTE — PROGRESS NOTES
Pt arrived to [facility] 4459 from ED via stretcher. Pt transferred from stretcher to bed via sliding with sheet. VS taken, assessment complete, height and weight obtained, pt oriented to room and educated on call light. 4 eye skin assessment complete and pt denies questions at this time.

## 2022-10-12 NOTE — PROGRESS NOTES
4 Eyes Admission Assessment     I agree as the admission nurse that 2 RN's have performed a thorough Head to Toe Skin Assessment on the patient. ALL assessment sites listed below have been assessed on admission. Areas assessed by both nurses:   [x]   Head, Face, and Ears   [x]   Shoulders, Back, and Chest  [x]   Arms, Elbows, and Hands   [x]   Coccyx, Sacrum, and Ischium- blanchable redness to coccyx, redness in kelley area  [x]   Legs, Feet, and Heels- stage 2 on 5th digit left foot        Does the Patient have Skin Breakdown?   Yes a wound was noted on the Admission Assessment and an LDA was Initiated documentation include the Kelley-wound, Wound Assessment, Measurements, Dressing Treatment, Drainage, and Color\",         Tulio Prevention initiated:  Yes   Wound Care Orders initiated:  No      [facility] nurse consulted for Pressure Injury (Stage 3,4, Unstageable, DTI, NWPT, and Complex wounds) or Tulio score 18 or lower:  No      Nurse 1 eSignature: Electronically signed by Ralph Nance RN on 10/11/22 at 11:06 PM EDT    **SHARE this note so that the co-signing nurse is able to place an eSignature**    Nurse 2 eSignature: Electronically signed by Ki Richards RN on 10/11/22 at 11:04 PM EDT

## 2022-10-12 NOTE — ED NOTES
Pt pulled out PIV at end of magnesium replacement. New PIV placed into RAC.       Abdiaziz Stout, GEOVANNY  10/11/22 7655

## 2022-10-12 NOTE — PROGRESS NOTES
Comprehensive Nutrition Assessment    Type and Reason for Visit:  Initial, Wound    Nutrition Recommendations/Plan:   Regular diet as tolerated, monitor swallow function   Assist with meals as needed to promote po   Magic cup with lunch, Ensure with breakfast and dinner, monitor tolerance  Monitor nutrition progress and goals of care     Malnutrition Assessment:  Malnutrition Status: At risk for malnutrition (Comment) (10/12/22 1691)      Nutrition Assessment:    Patient admitted with syncope. Nutrition risk triggered due to wound. History of dementia. Currently on a regular diet. No po intake recorded at this time. Will monitor nutrition progression and goals of care. RD ordered both Ensure and Magic cup with meals to further enhance meals. Nutrition Related Findings:    usual weight of 132 lbs Wound Type: Stage II (right toe)       Current Nutrition Intake & Therapies:    Average Meal Intake: Unable to assess  Average Supplements Intake: Unable to assess  ADULT DIET; Regular  ADULT ORAL NUTRITION SUPPLEMENT; Breakfast, Dinner; Standard High Calorie/High Protein Oral Supplement  ADULT ORAL NUTRITION SUPPLEMENT; Lunch; Frozen Oral Supplement    Anthropometric Measures:  Height: 5' 2\" (157.5 cm)  Ideal Body Weight (IBW): 110 lbs (50 kg)       Current Body Weight: 131 lb 2 oz (59.5 kg),   IBW. Weight Source: Bed Scale  Current BMI (kg/m2): 24                          BMI Categories: Normal Weight (BMI 18.5-24. 9)    Estimated Daily Nutrient Needs:  Energy Requirements Based On: Kcal/kg  Weight Used for Energy Requirements: Current (59.5 kg)  Energy (kcal/day): 4321-2787 (30-32 kcal/59.5 kg)  Weight Used for Protein Requirements: Current  Protein (g/day): 77-89 (1.3-1.5 g/59.5 kg)  Method Used for Fluid Requirements: 1 ml/kcal  Fluid (ml/day):      Nutrition Diagnosis:   Increased nutrient needs related to increase demand for energy/nutrients as evidenced by wounds    Nutrition Interventions:   Food and/or Nutrient Delivery: Continue Current Diet, Start Oral Nutrition Supplement  Nutrition Education/Counseling: Education not appropriate  Coordination of Nutrition Care: Continue to monitor while inpatient       Goals:     Goals: PO intake 50% or greater       Nutrition Monitoring and Evaluation:      Food/Nutrient Intake Outcomes: Food and Nutrient Intake, Supplement Intake  Physical Signs/Symptoms Outcomes: Nutrition Focused Physical Findings, Meal Time Behavior, Biochemical Data    Discharge Planning:     Too soon to determine     [facility], [address] [place], [address] [place] [address],   Contact: [phone]

## 2022-10-12 NOTE — PROGRESS NOTES
Occupational Therapy  Facility/Department: [place] [address] [facility]  Occupational Therapy Initial Assessment and Treatment      Name: Doug Mccormick  : 1940  MRN: [id number]  Date of Service: 10/12/2022    Discharge Recommendations:  Doug Mccormick scored a 18/24 on the AM-PAC ADL Inpatient form. Current research shows that an AM-PAC score of 18 or greater is typically associated with a discharge to the patient's home setting. Based on the patient's AM-PAC score, and their current ADL deficits, it is recommended that the patient have 2-3 sessions per week of Occupational Therapy at d/c to increase the patient's independence. At this time, this patient demonstrates the endurance and safety to discharge home with (home services) and a follow up treatment frequency of 2-3x/wk. Please see assessment section for further patient specific details. If patient discharges prior to next session this note will serve as a discharge summary. Please see below for the latest assessment towards goals. OT Equipment Recommendations  Equipment Needed: No       Patient Diagnosis(es): The primary encounter diagnosis was Urinary tract infection without hematuria, site unspecified. Diagnoses of NSTEMI (non-ST elevated myocardial infarction) ([address] Syncope and collapse, LBBB (left bundle branch block), and Hypokalemia were also pertinent to this visit. Past Medical History:  has no past medical history on file. Past Surgical History:  has no past surgical history on file. Treatment Diagnosis: impaired ADLs/transfers      Assessment   Performance deficits / Impairments: Decreased functional mobility ; Decreased ADL status; Decreased cognition;Decreased safe awareness  Assessment: Presenting from [facility] s/p falls. Pt is a poor historian and unable to provide meaningful information regarding PLOF - did mention she was evicted from a home (pt unsure of timeline).  Pt SB/CGA for functional transfers and ADLs; CGA for ambulation progressing to close SBA. Pt will require 24 hr S/A 2* impaired cognition. Pt will benefit from OT during admission. Continue per POC. Treatment Diagnosis: impaired ADLs/transfers  Decision Making: Medium Complexity  REQUIRES OT FOLLOW-UP: Yes  Activity Tolerance  Activity Tolerance: Patient Tolerated treatment well        Plan   Occupational Therapy Plan  Times Per Week: 2-5  Current Treatment Recommendations: Functional mobility training, Endurance training, Safety education & training, Self-Care / ADL, Cognitive reorientation     Restrictions  Position Activity Restriction  Other position/activity restrictions: up with assist    Subjective   General  Chart Reviewed: Yes  Additional Pertinent Hx: 80 y.o. F to ED w/ c/o 2 falls in the past 24 hours with an episode of syncope noted on her first fall. Hospital Course: CT Head: (-); CXR: (-); Pelvis xray: Mild bilateral hip osteoarthrosis with no acute osseous abnormality. PMH: dementia, hypothyroidism (admitted to [facility] as she was found wandering in the streets and has been running away from her son). Family / Caregiver Present: No  Referring Practitioner: Ariana Wolfe MD  Diagnosis: Syncope and Collapse  Subjective  Subjective: In bed on arrival. Agreeable to work with therapy. Oriented to self, \"hospital\". [place] how old she is. Social/Functional History  Social/Functional History  Lives With: Alone  Type of Home: House (reports she was thrown out of her rental - unsure of timeline)  Ambulation Assistance: Independent  Additional Comments: admitted from [facility] - found wandering in the streets and has been running away from her son. Pt is a poor historian and unable to provide meaningful history. Objective                Safety Devices  Type of Devices: Call light within reach; Chair alarm in place; Left in chair;Nurse notified;Sitter present (pt reclined)    Balance  Standing:  (SBA)    Gait  Overall Level of Assistance: Contact-guard assistance (to/from bathroom; mobility in hallway (~100'))    Toilet Transfers  Toilet - Technique: Ambulating  Equipment Used: Standard toilet  Toilet Transfer: Stand by assistance       ADL  Grooming: Stand by assistance (comb hair, brush teeth at sink level standing)  UB Dressing: SBA (gown change)  LE Dressing: Stand by assistance (depends)  Toileting: Stand by assistance (+ void)       Activity Tolerance  Activity Tolerance: Treatment limited secondary to decreased cognition;Patient tolerated treatment well    Bed mobility  Sit to Supine: Supervision  Scooting: Supervision    Transfers  Sit to stand: Contact guard assistance (first transfer; progressed to SBA for subsequent transfers)  Stand to sit: Stand by assistance    Vision  Vision: Impaired  Vision Exceptions: Wears glasses at all times  Hearing  Hearing: Within functional limits    Cognition  Overall Cognitive Status: Exceptions  Arousal/Alertness: Delayed responses to stimuli  Following Commands: Follows one step commands with increased time  Attention Span: Attends with cues to redirect  Memory: Decreased short term memory;Decreased long term memory  Safety Judgement: Decreased awareness of need for assistance;Decreased awareness of need for safety  Problem Solving: Decreased awareness of errors  Insights: Decreased awareness of deficits  Initiation: Requires cues for some  Sequencing: Requires cues for some  Cognition Comment: Pt with flat affect. Orientation  Overall Orientation Status: Impaired  Orientation Level: Oriented to place;Oriented to person                    Education Given To: Patient  Education Provided: Role of Therapy;Plan of Care  Education Method: Verbal  Barriers to Learning: Cognition  Education Outcome: Continued education needed                    Pt seen by OT for eval and treat.  Treatment included: bed mobility, functional transfer/mobility, ADL                                       AM-PAC Score AM-PAC Inpatient Daily Activity Raw Score: 18 (10/12/22 1214)  AM-PAC Inpatient ADL T-Scale Score : 38.66 (10/12/22 1214)  ADL Inpatient CMS 0-100% Score: 46.65 (10/12/22 1214)  ADL Inpatient CMS G-Code Modifier : CK (10/12/22 1214)      Goals  Short Term Goals  Time Frame for Short Term Goals: Discharge  Short Term Goal 1: toilet transfer w/ Supervision  Short Term Goal 2: grooming tasks in standing, Supervision  Short Term Goal 3: functional mobility for item retrieval, Supervision  Short Term Goal 4: LB dressing w/ Supervision  Patient Goals   Patient goals : no goals stated       Therapy Time   Individual Concurrent Group Co-treatment   Time In 0956         Time Out 1034         Minutes 38          Timed Code Treatment Minutes:   23    Total Treatment Minutes:  [facility] OTR/L #[id number]

## 2022-10-12 NOTE — PLAN OF CARE
Problem: Skin/Tissue Integrity  Goal: Absence of new skin breakdown  Description: 1. Monitor for areas of redness and/or skin breakdown  2. Assess vascular access sites hourly  3. Every 4-6 hours minimum:  Change oxygen saturation probe site  4. Every 4-6 hours:  If on nasal continuous positive airway pressure, respiratory therapy assess nares and determine need for appliance change or resting period. Outcome: Progressing  Note: Pt shows no new skin breakdown. Problem: Safety - Adult  Goal: Free from fall injury  Outcome: Progressing  Flowsheets (Taken 10/12/2022 0538)  Free From Fall Injury: Instruct family/caregiver on patient safety  Note: Pt will remain free from accidental injury this shift. Pt has fall risk measures (fall risk bracelet, fall risk sign, fall risk cloth, non-slip socks, dome light on) in place. Pt bed is in low position, bed alarm on, bed wheels locked, call light within reach, bedside table within reach, chair wheels locked, chair alarm on. Problem: ABCDS Injury Assessment  Goal: Absence of physical injury  Outcome: Progressing  Flowsheets (Taken 10/12/2022 0538)  Absence of Physical Injury: Implement safety measures based on patient assessment     Problem: Confusion  Goal: Confusion, delirium, dementia, or psychosis is improved or at baseline  Description: INTERVENTIONS:  1. Assess for possible contributors to thought disturbance, including medications, impaired vision or hearing, underlying metabolic abnormalities, dehydration, psychiatric diagnoses, and notify attending LIP  2. [place] high risk fall precautions, as indicated  3. Provide frequent short contacts to provide reality reorientation, refocusing and direction  4. Decrease environmental stimuli, including noise as appropriate  5. Monitor and intervene to maintain adequate nutrition, hydration, elimination, sleep and activity  6.  If unable to ensure safety without constant attention obtain sitter and review sitter guidelines with assigned personnel  7. Initiate Psychosocial CNS and Spiritual Care consult, as indicated  Outcome: Progressing  Flowsheets (Taken 10/12/2022 0538)  Effect of thought disturbance (confusion, delirium, dementia, or psychosis) are managed with adequate functional status:   [place] high risk fall precautions, as indicated   Assess for contributors to thought disturbance, including medications, impaired vision or hearing, underlying metabolic abnormalities, dehydration, psychiatric diagnoses, notify LIP   Provide frequent short contacts to provide reality reorientation, refocusing and direction   Decrease environmental stimuli, including noise as appropriate  Note: Pt is alert, but disoriented x4. Sitter is at bedside. Problem: Pain  Goal: Verbalizes/displays adequate comfort level or baseline comfort level  Outcome: Progressing  Flowsheets (Taken 10/12/2022 0538)  Verbalizes/displays adequate comfort level or baseline comfort level:   Encourage patient to monitor pain and request assistance   Assess pain using appropriate pain scale   Implement non-pharmacological measures as appropriate and evaluate response   Administer analgesics based on type and severity of pain and evaluate response  Note: Pt has denied pain during shift. Problem: Cardiovascular - Adult  Goal: Maintains optimal cardiac output and hemodynamic stability  Outcome: Progressing  Flowsheets (Taken 10/12/2022 0538)  Maintains optimal cardiac output and hemodynamic stability:   Monitor blood pressure and heart rate   Monitor urine output and notify Licensed Independent Practitioner for values outside of normal range   Assess for signs of decreased cardiac output  Note: Pt is on continuous telemetry and heart rhythm is NSR.      Problem: Respiratory - Adult  Goal: Achieves optimal ventilation and oxygenation  Outcome: Progressing  Flowsheets (Taken 10/12/2022 0538)  Achieves optimal ventilation and oxygenation:   Assess for changes in respiratory status   Position to facilitate oxygenation and minimize respiratory effort   Assess the need for suctioning and aspirate as needed   Respiratory therapy support as indicated   Assess for changes in mentation and behavior   Oxygen supplementation based on oxygen saturation or arterial blood gases   Encourage broncho-pulmonary hygiene including cough, deep breathe, incentive spirometry   Assess and instruct to report shortness of breath or any respiratory difficulty  Note: Pt remains on RA and SpO2 has been WNL.

## 2022-10-12 NOTE — CONSULTS
Dmitri 81   Electrophysiology Consultation   Date: 10/12/2022  Reason for Consultation: syncope,   Consult Requesting Physician: Crispin Arredondo MD     Chief Complaint   Patient presents with    Fall     Per [facility], falls x2 over the past 3 days. HPI: Yeny Bennett is a 80 y.o. Yeny Bennett is a 80 y.o. female with PMH as below notable for dementia who presented with fall. Patient was admitted to inpatient psychiatric unit as she was found wandering the streets or running away from her son. Per report patient has history of dementia at baseline. Per nursing facility report patient had 2 episodes of falling,  first one on 10/8/2022, and the last one on 10/11/2022. Patient had a mechanical fall and per nursing report via telephone. The patient did not have any complaints before or after a fall, vital signs also were within normal limits. Per nursing report patient did not lose consciousness. On admission EKG showed left bundle branch block, unable to determine if this is chronic or new. Troponin was trivially elevated at 0.03 and then trended down. UA was positive for infection and patient was a started on Ceftriaxone. Patient denies any exertional chest pain, dyspnea, palpitations, syncope, orthopnea, edema or paroxysmal nocturnal dyspnea. Patient was admitted for further work-up and management of fall but suspected syncopal episode in the setting of left bundle branch block and UTI. Electrophysiology was consulted for syncope with  left bundle branch block    No past medical history on file. No past surgical history on file. No Known Allergies    Social History:  Reviewed. Family History:  Reviewed. family history is not on file. No premature CAD.      Review of System:   Dementia thus no valid hx    Physical Examination:  Vitals:    10/12/22 1108   BP: (!) 143/77   Pulse: 90   Resp: 16   Temp: 97.6 °F (36.4 °C)   SpO2: 98%      In: 110.4 [P.O.:60; I.V.:10]  Out: 1    Wt Readings from Last 3 Encounters:   10/12/22 131 lb 2.8 oz (59.5 kg)     Temp  Av °F (36.7 °C)  Min: 97.6 °F (36.4 °C)  Max: 98.3 °F (36.8 °C)  Pulse  Av.9  Min: 79  Max: 100  BP  Min: 104/63  Max: 143/77  SpO2  Av.6 %  Min: 92 %  Max: 98 %    Intake/Output Summary (Last 24 hours) at 10/12/2022 1341  Last data filed at 10/12/2022 1029  Gross per 24 hour   Intake 110.43 ml   Output 1 ml   Net 109.43 ml       Physical Exam  Constitutional:       Appearance: Normal appearance. HENT:      Head: Atraumatic. Mouth/Throat:      Mouth: Mucous membranes are moist.   Eyes:      Conjunctiva/sclera: Conjunctivae normal.   Cardiovascular:      Rate and Rhythm: Normal rate and regular rhythm. Heart sounds: Normal heart sounds. Pulmonary:      Effort: Pulmonary effort is normal.      Breath sounds: Normal breath sounds. Abdominal:      General: Abdomen is flat. Bowel sounds are normal.      Palpations: Abdomen is soft. Musculoskeletal:         General: Normal range of motion. Cervical back: Normal range of motion. Neurological:      General: No focal deficit present. Mental Status: She is alert. Mental status is at baseline. She is disoriented. Comments: Patient disoriented to situation, unable to provide much story, able to follow commands    Psychiatric:         Mood and Affect: Mood normal.         Behavior: Behavior normal.       Labs:  Reviewed. Recent Labs     10/11/22  1529 10/12/22  0505   * 135*   K 3.3* 4.0   CL 99 99   CO2 25 25   BUN 16 13   CREATININE 0.6 0.5*     Recent Labs     10/11/22  1529 10/12/22  0505   WBC 6.2 6.8   HGB 13.0 12.9   HCT 38.9 38.0   MCV 96.1 95.3    223     Lab Results   Component Value Date/Time    TROPONINI 0.01 10/12/2022 05:05 AM     No results found for: BNP  No results found for: PROTIME, INR  No results found for: CHOL, HDL, TRIG    Diagnostic and imaging results reviewed.      ECG: LBBB      Scheduled Meds: amLODIPine  10 mg Oral Daily    atorvastatin  40 mg Oral Nightly    DULoxetine  30 mg Oral QPM    labetalol  100 mg Oral BID    levothyroxine  50 mcg Oral Daily    lisinopril  40 mg Oral Daily    risperiDONE  0.25 mg Oral BID    sodium chloride flush  5-40 mL IntraVENous 2 times per day    enoxaparin  40 mg SubCUTAneous Daily    cefTRIAXone (ROCEPHIN) IV  1,000 mg IntraVENous Q24H     Continuous Infusions:   sodium chloride       PRN Meds:.perflutren lipid microspheres, acetaminophen, traZODone, sodium chloride flush, sodium chloride, ondansetron **OR** ondansetron, polyethylene glycol, acetaminophen **OR** acetaminophen, potassium chloride **OR** potassium alternative oral replacement **OR** potassium chloride, magnesium sulfate     Assessment and plan:   Patient Active Problem List    Diagnosis Date Noted    Syncope, unspecified syncope type 10/11/2022      Active Hospital Problems    Diagnosis Date Noted    Syncope, unspecified syncope type [R55] 10/11/2022     Priority: Medium       Falls without loss of consciousness, LBBB  Per facility report patient did not lost consciousness. EKG showed LBBB, unable to determine chronicity. Patient does not report cardiac symptoms  -Patient not having syncopal episode or cardiac symptoms per nursing staff at facility   She had a mechanical fall per report. Will monitor  -echocardiogram pending      I independently reviewed EKG, [email or web address]    Thank you for allowing us  to participate in the care of Veronica SULLIVAN Jennifer Romero, PGY-2 participated in evaluation and work up with this patient and he scribed much of the H+P. Anum Presley.  MD Gi  10/12/2022  1:41 PM

## 2022-10-12 NOTE — CONSULTS
Clinical Pharmacy Progress Note  Medication History     Admit Date: 10/11/2022    Pharmacy consulted to verify home medication list by Dr. Raciel Riojas. List of of current medications patient is taking is complete. Home Medication list in EPIC updated to reflect changes noted below. Source of information: Medication list from [facility]; list dated 10/7/22    Changes made to medication list:   Medications added:   Labetalol 100mg BID  KCl 20 mEq PO BID x 5 days (started 10/7/22)  Medication doses adjusted:   Maalox  Other notes:   Risperidone 0.25 mg BID was ordered to start 10/11/22 - pt did not receive a dose prior to admission to [facility]    Current Outpatient Medications   Medication Instructions    acetaminophen (TYLENOL) 650 mg, Oral, EVERY 6 HOURS PRN    aluminum & magnesium hydroxide-simethicone (MAALOX) 200-200-20 MG/5ML SUSP suspension 30 mLs, Oral, EVERY 4 HOURS PRN    amLODIPine (NORVASC) 10 mg, Oral, DAILY    atorvastatin (LIPITOR) 40 mg, Oral, Nightly    DULoxetine (CYMBALTA) 30 mg, Oral, EVERY EVENING    hydroCHLOROthiazide (HYDRODIURIL) 25 mg, Oral, DAILY    labetalol (NORMODYNE) 100 mg, Oral, 2 TIMES DAILY    levothyroxine (SYNTHROID) 50 mcg, Oral, DAILY    lisinopril (PRINIVIL;ZESTRIL) 40 mg, Oral, DAILY    potassium chloride (KLOR-CON M) 20 MEQ extended release tablet 20 mEq, Oral, 2 TIMES DAILY    risperiDONE (RISPERDAL) 0.25 mg, Oral, 2 TIMES DAILY    terazosin (HYTRIN) 10 mg, Oral, DAILY    traZODone (DESYREL) 25 mg, Oral, BEDTIME PRN       Please call with any questions.   Randolph Hendrix, Maximino, BCPS  Wireless: [id number]   10/12/2022 8:41 AM

## 2022-10-12 NOTE — ED NOTES
Report called to RN on PCU. Pt to be transported with all belongings at bedside.       Mateus Yusuf RN  10/11/22 1246

## 2022-10-13 LAB
EKG ATRIAL RATE: 77 BPM
EKG DIAGNOSIS: NORMAL
EKG P AXIS: 33 DEGREES
EKG P-R INTERVAL: 168 MS
EKG Q-T INTERVAL: 408 MS
EKG QRS DURATION: 122 MS
EKG QTC CALCULATION (BAZETT): 461 MS
EKG R AXIS: -44 DEGREES
EKG T AXIS: 114 DEGREES
EKG VENTRICULAR RATE: 77 BPM
LV EF: 63 %
LVEF MODALITY: NORMAL

## 2022-10-13 PROCEDURE — 93010 ELECTROCARDIOGRAM REPORT: CPT | Performed by: INTERNAL MEDICINE

## 2022-10-13 PROCEDURE — 97530 THERAPEUTIC ACTIVITIES: CPT

## 2022-10-13 PROCEDURE — 6370000000 HC RX 637 (ALT 250 FOR IP): Performed by: INTERNAL MEDICINE

## 2022-10-13 PROCEDURE — 97116 GAIT TRAINING THERAPY: CPT

## 2022-10-13 PROCEDURE — 93306 TTE W/DOPPLER COMPLETE: CPT

## 2022-10-13 PROCEDURE — 6360000002 HC RX W HCPCS: Performed by: INTERNAL MEDICINE

## 2022-10-13 PROCEDURE — 2580000003 HC RX 258: Performed by: INTERNAL MEDICINE

## 2022-10-13 PROCEDURE — 2060000000 HC ICU INTERMEDIATE R&B

## 2022-10-13 PROCEDURE — 99222 1ST HOSP IP/OBS MODERATE 55: CPT | Performed by: NURSE PRACTITIONER

## 2022-10-13 PROCEDURE — 99233 SBSQ HOSP IP/OBS HIGH 50: CPT | Performed by: NURSE PRACTITIONER

## 2022-10-13 RX ORDER — CEFUROXIME AXETIL 250 MG/1
250 TABLET ORAL EVERY 12 HOURS SCHEDULED
Status: DISCONTINUED | OUTPATIENT
Start: 2022-10-13 | End: 2022-10-14 | Stop reason: HOSPADM

## 2022-10-13 RX ADMIN — SODIUM CHLORIDE, PRESERVATIVE FREE 10 ML: 5 INJECTION INTRAVENOUS at 09:09

## 2022-10-13 RX ADMIN — AMLODIPINE BESYLATE 10 MG: 10 TABLET ORAL at 09:09

## 2022-10-13 RX ADMIN — LABETALOL HYDROCHLORIDE 100 MG: 100 TABLET, FILM COATED ORAL at 20:16

## 2022-10-13 RX ADMIN — ENOXAPARIN SODIUM 40 MG: 100 INJECTION SUBCUTANEOUS at 09:09

## 2022-10-13 RX ADMIN — LEVOTHYROXINE SODIUM 50 MCG: 50 TABLET ORAL at 07:01

## 2022-10-13 RX ADMIN — CEFUROXIME AXETIL 250 MG: 250 TABLET ORAL at 20:55

## 2022-10-13 RX ADMIN — ATORVASTATIN CALCIUM 40 MG: 40 TABLET, FILM COATED ORAL at 20:18

## 2022-10-13 RX ADMIN — LISINOPRIL 40 MG: 40 TABLET ORAL at 09:09

## 2022-10-13 RX ADMIN — SODIUM CHLORIDE, PRESERVATIVE FREE 5 ML: 5 INJECTION INTRAVENOUS at 20:18

## 2022-10-13 RX ADMIN — DULOXETINE HYDROCHLORIDE 30 MG: 30 CAPSULE, DELAYED RELEASE ORAL at 18:33

## 2022-10-13 ASSESSMENT — PAIN SCALES - GENERAL
PAINLEVEL_OUTOF10: 0
PAINLEVEL_OUTOF10: 0

## 2022-10-13 NOTE — PROGRESS NOTES
Hospitalist Progress Note      PCP: No primary care provider on file. Date of Admission: 10/11/2022    Chief Complaint: falls    Hospital Course: 80 y.o. female who presents from [facility]iatric [facility] for further evaluation of falls/syncope. This patient has been admitted to [facility] as she was found wandering in the streets and has been running away from her son. Patient has history of dementia at baseline. Patient has had a fall with loss of consciousness-once yesterday at the facility. Patient had another fall, which is described as nonmechanical per facility staff-once without loss of consciousness. Patient has not had any head injury during these 2 falls as she was held. She does not take any antiplatelets or blood thinners. During hospital stay she continues to be pleasantly confused as she reports living alone but having significant difficulty keeping track of things the last two months. LBBB found on EKG with no comparison, is being worked up. Subjective: Oriented to self and time, partially oriented to location and situation. Pleasantly confused. Reports no chest pain, SOB, dizziness.       Medications:  Reviewed    Infusion Medications    sodium chloride       Scheduled Medications    amLODIPine  10 mg Oral Daily    atorvastatin  40 mg Oral Nightly    DULoxetine  30 mg Oral QPM    labetalol  100 mg Oral BID    levothyroxine  50 mcg Oral Daily    lisinopril  40 mg Oral Daily    [Held by provider] risperiDONE  0.25 mg Oral BID    sodium chloride flush  5-40 mL IntraVENous 2 times per day    enoxaparin  40 mg SubCUTAneous Daily    cefTRIAXone (ROCEPHIN) IV  1,000 mg IntraVENous Q24H     PRN Meds: perflutren lipid microspheres, traZODone, sodium chloride flush, sodium chloride, ondansetron **OR** ondansetron, polyethylene glycol, acetaminophen **OR** acetaminophen, potassium chloride **OR** potassium alternative oral replacement **OR** potassium chloride, magnesium sulfate      Intake/Output Summary (Last 24 hours) at 10/13/2022 1103  Last data filed at 10/12/2022 2100  Gross per 24 hour   Intake 80 ml   Output --   Net 80 ml       Physical Exam Performed:    /67   Pulse 57   Temp 98.1 °F (36.7 °C) (Oral)   Resp 17   Ht 5' 2\" (1.575 m)   Wt 129 lb 3 oz (58.6 kg)   SpO2 96%   BMI 23.63 kg/m²     General appearance: No apparent distress, cooperative  HEENT: Pupils equal, round, and reactive to light. Conjunctivae/corneas clear. Neck: Supple, with full range of motion. No jugular venous distention. Trachea midline. Respiratory:  Normal respiratory effort. Clear to auscultation, bilaterally without Rales/Wheezes/Rhonchi. Cardiovascular: Regular rate and rhythm with normal S1/S2 without murmurs, rubs or gallops. Abdomen: Soft, non-tender, non-distended with normal bowel sounds. Musculoskeletal: No clubbing, cyanosis or edema bilaterally. Full range of motion without deformity. Skin: Skin color, texture, turgor normal.  No rashes or lesions. Neurologic:  No focal deficits  Psychiatric: Not well oriented, pleasantly confused  Capillary Refill: Brisk, 3 seconds, normal   Peripheral Pulses: +2 palpable, equal bilaterally       Labs:   Recent Labs     10/11/22  1529 10/12/22  0505   WBC 6.2 6.8   HGB 13.0 12.9   HCT 38.9 38.0    223     Recent Labs     10/11/22  1529 10/12/22  0505   * 135*   K 3.3* 4.0   CL 99 99   CO2 25 25   BUN 16 13   CREATININE 0.6 0.5*   CALCIUM 9.4 9.7     Recent Labs     10/11/22  1529   AST 31   ALT 53*   BILITOT 0.7   ALKPHOS 82     No results for input(s): INR in the last 72 hours.   Recent Labs     10/11/22  1635 10/11/22  2351 10/12/22  0505   TROPONINI 0.03* 0.02* 0.01       Urinalysis:      Lab Results   Component Value Date/Time    NITRU POSITIVE 10/11/2022 04:12 PM    WBCUA 6-9 10/11/2022 04:12 PM    BACTERIA 4+ 10/11/2022 04:12 PM    RBCUA 0-2 10/11/2022 04:12 PM    BLOODU Negative 10/11/2022 04:12 PM    SPECGRAV 1.020 10/11/2022 04:12 PM    GLUCOSEU Negative 10/11/2022 04:12 PM       Radiology:  XR PELVIS (1-2 VIEWS)   Final Result      Mild bilateral hip osteoarthrosis with no acute osseous abnormality. Lower lumbar spondylosis. XR CHEST 1 VIEW   Final Result      No acute radiographic abnormality of the chest.      CT Head W/O Contrast   Final Result      No acute intracranial hemorrhage or mass effect. Mild chronic small vessel ischemic change. Assessment/Plan:    Active Hospital Problems    Diagnosis     Fall [W19. XXXA]      Priority: Medium    LBBB (left bundle branch block) [I44.7]      Priority: Medium     Syncope  New LBBB on EKG, no prior hx or EKG for comparison, no chest pain, negative orthostats   - Cardiology consulted, appreciate recommendations   - Echo today, pending   - Telemonitoring    UTI  Long history of UTIs per patient, no past cultures found   - On rocephin, can transition to PO antibiotic    Dementia  Poor orientation and memory/cognition, history of wandering   - Watcher   - Psychiatry and social work consulted      DVT Prophylaxis: Lovenox 40 mg subQ daily  Diet: ADULT DIET;  Regular  ADULT ORAL NUTRITION SUPPLEMENT; Breakfast, Dinner; Standard High Calorie/High Protein Oral Supplement  ADULT ORAL NUTRITION SUPPLEMENT; Lunch; Frozen Oral Supplement  Code Status: Full Code  PT/OT Eval Status: 20/24; pending    Dispo - pending psych consultation        Hai Mcghee

## 2022-10-13 NOTE — CONSULTS
PSYCHIATRY CONSULT, INITIAL EVALUATION    Attending Provider:  Mary Parker MD    CC/Reason for Consult: Pt from [facility]    HPI: Pallavi Kramer is a 80 yr old F with a history of dementia, hypothyroidism, previous UTIs,  who presented to the ER on 10/11/2022 for a chief complaint of fall. Came from [facility] where she was on an involuntary hold. Reported she had fallen twice in the past 24 hrs and fainted after her first fall. Reports she did not hit her head on the second fall. Has noticed she is having issues with walking and has been tripping while at [facility]. Reported generally not feeling well. Of note, she was admitted to [facility] 4 days prior (10/7/2022) found wandering away from home, having some paranoia, suicidal ideation and was taken to [facility] in [place] had to be [facility] and was placed on an involuntary [facility]. EKG completed Normal sinus rhythm with left bundle branch block. QTc 489 ms. Left axis deviation, CT of head (- ) for acute intercranial hemorrhage or mass effect. Troponin was 0.03 initially and on repeat. Noted to have a non-ST elevated myocardial infarction, U/A + for infection. Decision to admit for further cardiac workup and tx of UTI. context: medical concerns, involuntary hospitalization    associated symptoms: sadness, loneliness, anhedonia, grief, not physically feeling well  modifying factors: recently started on cymbalta at [facility]  Timing: past week or so  duration: ongoing  severity: moderate    Met with Tiny in her room. She was pleasant and willing to speak with me. She was noted to be alert and oriented X 4 today so I d/c'd her having a sitter at present. She did not display any s/s of psychosis or a neurocognitive disorder at this time. She did not appear confused. She reported that she felt so very much better today than she had in a long time. Stated she felt that a \"fog had lifted\" from her brain.  Realized now that she must have been sick with a UTI when she was at home. Recalled that she went out for a walk and was a little way away from her home when she got lost. Stated that she was unsure as to how she got so very confused but some policemen just happened to drive by and stop to see if she needed help. Reported that she told them she thought she did because she was lost. They took her to the hospital and then she was confused again because she was told she could not leave but had to go to the hospital in [place]. Reported that she is very lonely since her   6 years ago. They almost grew up together and were both living in [place]. Stated they were  for 62 yrs. She got used to him always being with her and it has been hard since he was gone. Reported that she saw a lady psychiatrist that she liked a lot after he . Was put on some medication and it really seemed to help. She took it for a while but then the psychiatrist left and she got a new one, a male that she could not understand very well so she stopped going. Also thought she did not really need the medication so stopped taking it. Now thinks she should have continued it. Reports that she has only thought about not being alive once six months ago, stated that she thought she would be better off not having to go through everything here but then told herself to stop thinking that way and move on. Reported it has been hard to move on without her . Also recalled when her younger brother  when pt was 13. Reported that he was handicapped and she always cared for him so his death was a great loss to her. Misses her children and grandchildren but knows they are all busy and does not like to bother them. Also stated that she did not think her son understood how bad she was feeling recently. Does endorse s/s of depression but does not want to return to [facility] because she does not like to talk in groups and tell others her her feelings.  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 Psychiatric: states daughter deals with depression as well, younger brother had physical handicaps but does not endorse any other family members with mental health concerns    No Known Allergies    Medications:  Scheduled Meds:   cefUROXime  250 mg Oral 2 times per day    amLODIPine  10 mg Oral Daily    atorvastatin  40 mg Oral Nightly    DULoxetine  30 mg Oral QPM    labetalol  100 mg Oral BID    levothyroxine  50 mcg Oral Daily    lisinopril  40 mg Oral Daily    [Held by provider] risperiDONE  0.25 mg Oral BID    sodium chloride flush  5-40 mL IntraVENous 2 times per day    enoxaparin  40 mg SubCUTAneous Daily       OBJECTIVE:  Weight: 129 lb 3 oz (58.6 kg), BP: (!) 105/59, Pain 0-10: Pain Level: 0, Location: 5th digit R foot;     MSE:   Appearance    alert, cooperative, mild distress, smiling  Motor: No abnormal movements, tics or mannerisms.   Speech    spontaneous, regular in rate but soft in volume  Language    0 - no aphasia, normal  Mood/Affect   \"ok\" per pt / normal affect  Thought Process    goal directed and coherent  Thought Content    cognitive distortions , no suicidal ideation  Associations    logical connections  Attention/Concentration    intact  Orientation    oriented to person, place, time, and general circumstances  Memory    remote memory intact, recent memory impaired  Fund of Knowledge    intact  Insight/Judgement    Good / Intact    Labs:     Imaging: CT head WO contrast 10/11/2022  Impression       No acute intracranial hemorrhage or mass effect. Mild chronic small vessel ischemic change. EKG: 10/12/2022  Normal sinus rhythm with sinus arrhythmia  Left axis deviation  Left bundle branch block  Abnormal ECG  Vent. rate 77 BPM  HI interval 168 ms  QRS duration 122 ms  QT/QTc 408/461 ms  P-R-T axes 33 -44 114     ASSESSMENT:   Luz Meckel is a 80 yr old female who is currently experiencing symptoms of major depression. She is A & O x 4 today and appears very clear in her mentation, although she does endorse having had issues with her memory and getting easily confused over the past several weeks at home. She feels like a\"fog has been lifted\" from her brain today and she is starting to feel much better. It is possible that this fog may have been due to her having a UTI long before coming to the hospital. She is not actively suicidal today and is future oriented. Would like to d/c home with one of her children and then transition to her own home in a few days. Does not want to return to [facility] at this time. RECOMMENDATIONS:   1. Continue current medication cymbalta 30 mg and trazodone 25 mg as needed for sleep. Risperdal is on hold but may d/c since has not had any s/s of psychosis most recently. 2. D/C personal sitter now as pt is not currently trying to wander off or to get out of bed unassisted. She is not a danger to herself or others at this time. 3. Does not need to return to [facility] at this time. Does not currently meet criterion for inpatient psychiatric treatment and would no longer benefit from it at this time.     Dispo: Home with one of her children with HHC, then transition to her own home when ready. Thank you for this consult, please call the psychiatry consult line for further questions. Today, I have spent more than 80 minutes face to face with pt, speaking with members of the treatment team, reviewing medical records, and documenting within patient's EMR.      [address] [facility], APRN, [facility]  Advanced Practice Registered Nurse  Psychiatric Mental Health Certified Nurse Practitioner  10/13/2022

## 2022-10-13 NOTE — PLAN OF CARE
Problem: Skin/Tissue Integrity  Goal: Absence of new skin breakdown  Description: 1. Monitor for areas of redness and/or skin breakdown  2. Assess vascular access sites hourly  3. Every 4-6 hours minimum:  Change oxygen saturation probe site  4. Every 4-6 hours:  If on nasal continuous positive airway pressure, respiratory therapy assess nares and determine need for appliance change or resting period. Outcome: Progressing  Note: No new skin breakdown noted. Kept sheets unwrinkled. Removed clutters. Problem: Safety - Adult  Goal: Free from fall injury  Outcome: Progressing  Flowsheets (Taken 10/13/2022 0114)  Free From Fall Injury:   Instruct family/caregiver on patient safety   Based on caregiver fall risk screen, instruct family/caregiver to ask for assistance with transferring infant if caregiver noted to have fall risk factors  Note: Sitter at bedside. Frequents rounds. Call light within reach. Bed/chair wheels locked. Bed/chair alarms activated. Problem: ABCDS Injury Assessment  Goal: Absence of physical injury  Outcome: Progressing  Flowsheets (Taken 10/13/2022 0114)  Absence of Physical Injury: Implement safety measures based on patient assessment  Note: Will be free from fall in this shift. Sitter at bedside. Problem: Confusion  Goal: Confusion, delirium, dementia, or psychosis is improved or at baseline  Description: INTERVENTIONS:  1. Assess for possible contributors to thought disturbance, including medications, impaired vision or hearing, underlying metabolic abnormalities, dehydration, psychiatric diagnoses, and notify attending LIP  2. [place] high risk fall precautions, as indicated  3. Provide frequent short contacts to provide reality reorientation, refocusing and direction  4. Decrease environmental stimuli, including noise as appropriate  5. Monitor and intervene to maintain adequate nutrition, hydration, elimination, sleep and activity  6.  If unable to ensure safety without constant attention obtain sitter and review sitter guidelines with assigned personnel  7. Initiate Psychosocial CNS and Spiritual Care consult, as indicated  Outcome: Progressing  Flowsheets (Taken 10/13/2022 0114)  Effect of thought disturbance (confusion, delirium, dementia, or psychosis) are managed with adequate functional status:   [place] high risk fall precautions, as indicated   Assess for contributors to thought disturbance, including medications, impaired vision or hearing, underlying metabolic abnormalities, dehydration, psychiatric diagnoses, notify LIP   Provide frequent short contacts to provide reality reorientation, refocusing and direction   If unable to ensure safety without constant attention obtain sitter and review sitter guidelines with assigned personnel   Monitor and intervene to maintain adequate nutrition, hydration, elimination, sleep and activity   Decrease environmental stimuli, including noise as appropriate  Note: Disoriented. Sitter at bedside. Frequent rounds. Problem: Pain  Goal: Verbalizes/displays adequate comfort level or baseline comfort level  Outcome: Progressing  Flowsheets (Taken 10/13/2022 0114)  Verbalizes/displays adequate comfort level or baseline comfort level:   Assess pain using appropriate pain scale   Administer analgesics based on type and severity of pain and evaluate response   Encourage patient to monitor pain and request assistance   Implement non-pharmacological measures as appropriate and evaluate response   Consider cultural and social influences on pain and pain management  Note: No complains of pain.  Will continue to monitor     Problem: Cardiovascular - Adult  Goal: Maintains optimal cardiac output and hemodynamic stability  Outcome: Progressing  Flowsheets (Taken 10/13/2022 0114)  Maintains optimal cardiac output and hemodynamic stability:   Monitor blood pressure and heart rate   Assess for signs of decreased cardiac output   Administer fluid and/or volume expanders as ordered   Monitor urine output and notify Licensed Independent Practitioner for values outside of normal range   Administer vasoactive medications as ordered  Note: Vitals stable. Sinus rhythm on tele. Problem: Respiratory - Adult  Goal: Achieves optimal ventilation and oxygenation  Outcome: Progressing  Flowsheets (Taken 10/13/2022 0121)  Achieves optimal ventilation and oxygenation:   Assess for changes in respiratory status   Assess the need for suctioning and aspirate as needed   Oxygen supplementation based on oxygen saturation or arterial blood gases   Position to facilitate oxygenation and minimize respiratory effort   Assess and instruct to report shortness of breath or any respiratory difficulty  Note: Maintains on room air and saturation is within normal limits. Problem: Nutrition Deficit:  Goal: Optimize nutritional status  Outcome: Progressing  Flowsheets (Taken 10/13/2022 0121)  Nutrient intake appropriate for improving, restoring, or maintaining nutritional needs:   Assess nutritional status and recommend course of action   Recommend appropriate diets, oral nutritional supplements, and vitamin/mineral supplements   Monitor oral intake, labs, and treatment plans  Note: Offered snacks however patient declined as she is not yet hungry. Will continue to monitor oral intake.

## 2022-10-13 NOTE — PROGRESS NOTES
Physical Therapy    Daily Treatment Note    Discharge Recommendations:  Rory Norris scored a 20/24 on the AM-PAC short mobility form. Current research shows that an AM-PAC score of 18 or greater is typically associated with a discharge to the patient's home setting. Based on the patient's AM-PAC score and their current functional mobility deficits, it is recommended that the patient have 2-3 sessions per week of Physical Therapy at d/c to increase the patient's independence. At this time, this patient demonstrates the endurance and safety to discharge home. Please see assessment section for further patient specific details. Assessment:  Increased balance with use of rolling walker today. Recommend rolling walker for continued ambulation. Safety concerns for pt living alone from cognitive standpoint. Pt will need 24hr assist and home PT at d/c to ensure safety. Equipment Needs:  Rolling walker         Other position/activity restrictions: up with assist   Additional Pertinent Hx: 80 y.o. female with a history of dementia, hypothyroidism who presents to the ED today for evaluation of falls/syncope. CXR/pelvic CT/head CT neg; cardio consult; positive NSTEMI; UA positive UTI Pmhx: patient was admitted [facility] ago to [facility] at that time she was found wandering away from home and was having some paranoia,  HTN, heart murmur, dementia. Diagnosis: syncope/falls/UTI   Treatment Diagnosis: mobility impairment due to syncope      Subjective:  Pt found supine in bed. \"Today is the first day I've woken up feeling like me. \"  Noted pt still has periods of confusion and unsure how she got to [facility] in [place]. \"I'm from [place]. \"     Pain:  Denies      Objective:    Bed mobility  Supine to sit:  Modified independent (HOB raised)    Transfers  Sit to stand:  SBA   Stand to sit:  SBA    Ambulation 1  Assistance Level:  CGA  Assistive device:  None  Distance:  200ft   Quality of gait:  Slow and hesitant gait, occasionally reaching out for rail in [place], unsteady at times      Ambulation 2  Assistance Level:  SBA  Assistive device:  Rolling walker  Distance:  300ft   Quality of gait:  increased balance with use of rolling walker, appropriate gait speed, steady    Exercises  Pt performed 10 reps heel raises and mini squats at walker with SBA for balance  Pt performed 10 reps AP, SLR and heel slides reclined in chair. Neuro/balance  Sitting balance:  WFL  Static stance:  SBA  Dynamic stance:  CGA without UE support, SBA with UE support    Patient Education  Role of PT and rolling walker. Pt verbalized and demonstrates learning. Recommend ongoing reinforcement/education. Safety Devices  Pt left with needs in reach, seated in chair with alarm in place and RN aware. AM-PAC score  AM-PAC Inpatient Mobility Raw Score : 20  AM-PAC Inpatient T-Scale Score : 47.67  Mobility Inpatient CMS 0-100% Score: 35.83  Mobility Inpatient CMS G-Code Modifier : CJ    Goals:  Goals not met this treatment, continue with current goals. Time Frame for Short Term Goals: discharge  Short Term Goal 1: sit to/from stand independent   Short Term Goal 2: ambulate 300 ft independent       Plan:  2-5x/week  Current Treatment Recommendations: Transfer training, Gait training, Functional mobility training    Therapy Time    Individual  Concurrent  Group  Co-treatment    Time In  1035            Time Out  1113            Minutes  38            Timed Code Treatment Minutes:  38  Total Treatment Minutes:  38      If patient is discharged prior to next treatment, this note will serve as the discharge summary.     Joe Hammans, PT Yes

## 2022-10-14 VITALS
DIASTOLIC BLOOD PRESSURE: 66 MMHG | WEIGHT: 130.07 LBS | BODY MASS INDEX: 23.94 KG/M2 | RESPIRATION RATE: 17 BRPM | TEMPERATURE: 98.7 F | HEIGHT: 62 IN | SYSTOLIC BLOOD PRESSURE: 134 MMHG | OXYGEN SATURATION: 92 % | HEART RATE: 58 BPM

## 2022-10-14 PROBLEM — N39.0 URINARY TRACT INFECTION WITHOUT HEMATURIA: Status: ACTIVE | Noted: 2022-10-14

## 2022-10-14 PROBLEM — F32.1 CURRENT MODERATE EPISODE OF MAJOR DEPRESSIVE DISORDER (HCC): Status: ACTIVE | Noted: 2022-10-14

## 2022-10-14 PROCEDURE — 6370000000 HC RX 637 (ALT 250 FOR IP): Performed by: INTERNAL MEDICINE

## 2022-10-14 PROCEDURE — 2580000003 HC RX 258: Performed by: INTERNAL MEDICINE

## 2022-10-14 PROCEDURE — 6360000002 HC RX W HCPCS: Performed by: INTERNAL MEDICINE

## 2022-10-14 RX ORDER — LISINOPRIL 40 MG/1
40 TABLET ORAL DAILY
Qty: 30 TABLET | Refills: 0 | Status: SHIPPED | OUTPATIENT
Start: 2022-10-14

## 2022-10-14 RX ORDER — CEFUROXIME AXETIL 250 MG/1
250 TABLET ORAL EVERY 12 HOURS SCHEDULED
Qty: 10 TABLET | Refills: 0 | Status: SHIPPED | OUTPATIENT
Start: 2022-10-14 | End: 2022-10-19

## 2022-10-14 RX ORDER — AMLODIPINE BESYLATE 10 MG/1
10 TABLET ORAL DAILY
Qty: 30 TABLET | Refills: 0 | Status: SHIPPED | OUTPATIENT
Start: 2022-10-14

## 2022-10-14 RX ORDER — TRAZODONE HYDROCHLORIDE 50 MG/1
25 TABLET ORAL
Qty: 15 TABLET | Refills: 0 | Status: SHIPPED | OUTPATIENT
Start: 2022-10-14 | End: 2022-11-13

## 2022-10-14 RX ORDER — MAGNESIUM HYDROXIDE/ALUMINUM HYDROXICE/SIMETHICONE 120; 1200; 1200 MG/30ML; MG/30ML; MG/30ML
10 SUSPENSION ORAL EVERY 4 HOURS PRN
Qty: 1 EACH | Refills: 0 | Status: SHIPPED | OUTPATIENT
Start: 2022-10-14 | End: 2022-11-13

## 2022-10-14 RX ORDER — LEVOTHYROXINE SODIUM 0.05 MG/1
50 TABLET ORAL DAILY
Qty: 30 TABLET | Refills: 0 | Status: SHIPPED | OUTPATIENT
Start: 2022-10-14

## 2022-10-14 RX ORDER — DULOXETIN HYDROCHLORIDE 30 MG/1
30 CAPSULE, DELAYED RELEASE ORAL EVERY EVENING
Qty: 30 CAPSULE | Refills: 0 | Status: SHIPPED | OUTPATIENT
Start: 2022-10-14

## 2022-10-14 RX ORDER — ATORVASTATIN CALCIUM 40 MG/1
40 TABLET, FILM COATED ORAL NIGHTLY
Qty: 30 TABLET | Refills: 0 | Status: SHIPPED | OUTPATIENT
Start: 2022-10-14

## 2022-10-14 RX ORDER — LABETALOL 200 MG/1
100 TABLET, FILM COATED ORAL 2 TIMES DAILY
Qty: 30 TABLET | Refills: 0 | Status: SHIPPED | OUTPATIENT
Start: 2022-10-14 | End: 2022-11-13

## 2022-10-14 RX ADMIN — ENOXAPARIN SODIUM 40 MG: 100 INJECTION SUBCUTANEOUS at 08:52

## 2022-10-14 RX ADMIN — LEVOTHYROXINE SODIUM 50 MCG: 50 TABLET ORAL at 06:07

## 2022-10-14 RX ADMIN — SODIUM CHLORIDE, PRESERVATIVE FREE 10 ML: 5 INJECTION INTRAVENOUS at 09:07

## 2022-10-14 RX ADMIN — AMLODIPINE BESYLATE 10 MG: 10 TABLET ORAL at 08:52

## 2022-10-14 RX ADMIN — LISINOPRIL 40 MG: 40 TABLET ORAL at 08:52

## 2022-10-14 RX ADMIN — CEFUROXIME AXETIL 250 MG: 250 TABLET ORAL at 08:52

## 2022-10-14 ASSESSMENT — PAIN SCALES - GENERAL
PAINLEVEL_OUTOF10: 0
PAINLEVEL_OUTOF10: 0

## 2022-10-14 NOTE — CARE COORDINATION
CTN contacted Julieta with "[employer] ([email or web address] [phone]. They have accepted this patient and will pull referral from [facility].  They will contact patient and make arrangements for [facility] by 10/16  Electronically signed by Costa Chavez LPN on 06/83/5790 at 10:36 AM

## 2022-10-14 NOTE — PLAN OF CARE
Problem: Skin/Tissue Integrity  Goal: Absence of new skin breakdown  Description: 1. Monitor for areas of redness and/or skin breakdown  2. Assess vascular access sites hourly  3. Every 4-6 hours minimum:  Change oxygen saturation probe site  4. Every 4-6 hours:  If on nasal continuous positive airway pressure, respiratory therapy assess nares and determine need for appliance change or resting period. 10/14/2022 0059 by Babs Gardner RN  Outcome: Progressing  Note: Applied mepilex on coccyx and heel L. Problem: Safety - Adult  Goal: Free from fall injury  10/14/2022 0059 by Babs Gardner RN  Outcome: Progressing  Note: Pt is a Fall Risk. See Jodi Xavier Fall Risk Score. Pt bed in low position and side rails up. Call light and belongings in reach. Pt encouraged to call for assistance. Will continue with hourly rounds for PO intake, pain needs, toileting, and repositioning as needed. Bed alarm on. Near nurses' station. On video monitoring [employer]. Problem: ABCDS Injury Assessment  Goal: Absence of physical injury  Outcome: Progressing  Flowsheets (Taken 10/14/2022 0059)  Absence of Physical Injury: Implement safety measures based on patient assessment     Problem: Confusion  Goal: Confusion, delirium, dementia, or psychosis is improved or at baseline  Description: INTERVENTIONS:  1. Assess for possible contributors to thought disturbance, including medications, impaired vision or hearing, underlying metabolic abnormalities, dehydration, psychiatric diagnoses, and notify attending LIP  2. [place] high risk fall precautions, as indicated  3. Provide frequent short contacts to provide reality reorientation, refocusing and direction  4. Decrease environmental stimuli, including noise as appropriate  5. Monitor and intervene to maintain adequate nutrition, hydration, elimination, sleep and activity  6.  If unable to ensure safety without constant attention obtain sitter and review sitter guidelines with assigned personnel  7.  Initiate Psychosocial CNS and Spiritual Care consult, as indicated  Outcome: Progressing  Flowsheets (Taken 10/14/2022 0059)  Effect of thought disturbance (confusion, delirium, dementia, or psychosis) are managed with adequate functional status:   Assess for contributors to thought disturbance, including medications, impaired vision or hearing, underlying metabolic abnormalities, dehydration, psychiatric diagnoses, notify Spike Perez high risk fall precautions, as indicated   Provide frequent short contacts to provide reality reorientation, refocusing and direction   Decrease environmental stimuli, including noise as appropriate   Monitor and intervene to maintain adequate nutrition, hydration, elimination, sleep and activity     Problem: Cardiovascular - Adult  Goal: Maintains optimal cardiac output and hemodynamic stability  Outcome: Progressing  Flowsheets (Taken 10/14/2022 0059)  Maintains optimal cardiac output and hemodynamic stability:   Monitor blood pressure and heart rate   Monitor urine output and notify Licensed Independent Practitioner for values outside of normal range   Assess for signs of decreased cardiac output

## 2022-10-14 NOTE — PLAN OF CARE
Problem: Discharge Planning  Goal: Discharge to home or other facility with appropriate resources  Outcome: Completed     Problem: Skin/Tissue Integrity  Goal: Absence of new skin breakdown  Description: 1. Monitor for areas of redness and/or skin breakdown  2. Assess vascular access sites hourly  3. Every 4-6 hours minimum:  Change oxygen saturation probe site  4. Every 4-6 hours:  If on nasal continuous positive airway pressure, respiratory therapy assess nares and determine need for appliance change or resting period. 10/14/2022 1214 by Shawna Lincoln RN  Outcome: Completed  10/14/2022 0059 by Ghazal Dyson RN  Outcome: Progressing  Note: Applied mepilex on coccyx and heel L. Problem: Safety - Adult  Goal: Free from fall injury  10/14/2022 1214 by Shawna Lincoln RN  Outcome: Completed  10/14/2022 0059 by Ghazal Dyson RN  Outcome: Progressing  Note: Pt is a Fall Risk. See Waldo Percy Fall Risk Score. Pt bed in low position and side rails up. Call light and belongings in reach. Pt encouraged to call for assistance. Will continue with hourly rounds for PO intake, pain needs, toileting, and repositioning as needed. Bed alarm on. Near nurses' station. On video monitoring [employer]. Problem: ABCDS Injury Assessment  Goal: Absence of physical injury  10/14/2022 1214 by Shawna Lincoln RN  Outcome: Completed  10/14/2022 0059 by Ghazal Dyson RN  Outcome: Progressing  Flowsheets (Taken 10/14/2022 0059)  Absence of Physical Injury: Implement safety measures based on patient assessment     Problem: Confusion  Goal: Confusion, delirium, dementia, or psychosis is improved or at baseline  Description: INTERVENTIONS:  1. Assess for possible contributors to thought disturbance, including medications, impaired vision or hearing, underlying metabolic abnormalities, dehydration, psychiatric diagnoses, and notify attending LIP  2. [place] high risk fall precautions, as indicated  3.  Provide frequent short contacts to provide reality reorientation, refocusing and direction  4. Decrease environmental stimuli, including noise as appropriate  5. Monitor and intervene to maintain adequate nutrition, hydration, elimination, sleep and activity  6. If unable to ensure safety without constant attention obtain sitter and review sitter guidelines with assigned personnel  7.  Initiate Psychosocial CNS and Spiritual Care consult, as indicated  10/14/2022 1214 by Jackie Holguin RN  Outcome: Completed  10/14/2022 0059 by Irene Jha RN  Outcome: Progressing  Flowsheets (Taken 10/14/2022 0059)  Effect of thought disturbance (confusion, delirium, dementia, or psychosis) are managed with adequate functional status:   Assess for contributors to thought disturbance, including medications, impaired vision or hearing, underlying metabolic abnormalities, dehydration, psychiatric diagnoses, notify [facility] high risk fall precautions, as indicated   Provide frequent short contacts to provide reality reorientation, refocusing and direction   Decrease environmental stimuli, including noise as appropriate   Monitor and intervene to maintain adequate nutrition, hydration, elimination, sleep and activity     Problem: Pain  Goal: Verbalizes/displays adequate comfort level or baseline comfort level  Outcome: Completed     Problem: Cardiovascular - Adult  Goal: Maintains optimal cardiac output and hemodynamic stability  10/14/2022 1214 by Jackie Holguin RN  Outcome: Completed  10/14/2022 0059 by Irene Jha RN  Outcome: Progressing  Flowsheets (Taken 10/14/2022 0059)  Maintains optimal cardiac output and hemodynamic stability:   Monitor blood pressure and heart rate   Monitor urine output and notify Licensed Independent Practitioner for values outside of normal range   Assess for signs of decreased cardiac output     Problem: Respiratory - Adult  Goal: Achieves optimal ventilation and oxygenation  Outcome: Completed     Problem: Nutrition Deficit:  Goal: Optimize nutritional status  Outcome: Completed

## 2022-10-14 NOTE — CARE COORDINATION
Case Management Assessment            Discharge Note                    Date / Time of Note: 10/14/2022 12:41 PM                  Discharge Note Completed by: Cesar Parker RN    Patient Name: Annalee Corbett   YOB: 1940  Diagnosis: Syncope and collapse [R55]  Hypokalemia [E87.6]  LBBB (left bundle branch block) [I44.7]  NSTEMI (non-ST elevated myocardial infarction) ([facility] [address] [I21.4]  Urinary tract infection without hematuria, site unspecified [N39.0]  Syncope, unspecified syncope type [R55]   Date / Time: 10/11/2022  1:52 PM    Current PCP: No primary care provider on file. Clinic patient: No    Hospitalization in the last 30 days: No    Advance Directives:  Code Status: Full Code  [place] DNR form completed and on chart: No, Not Indicated    Financial:  Payor: Kasia Kirby / Plan: Ekta Duncan ESSENTIAL/PLUS / Product Type: *No Product type* /      Pharmacy:    [facility] [id number] [facility] [place], [address] [place] [address] [facility] [phone] - F [phone] OLD [address] [place] [address]  Phone: [phone] Fax: [phone]      Assistance purchasing medications?:    Assistance provided by Case Management: None at this time    Does patient want to participate in local refill/ meds to beds program?: Yes    Meds To Beds General Rules:  1. Can ONLY be done Monday- Friday between 8:30am-5pm  2. Prescription(s) must be in pharmacy by 3pm to be filled same day  3. Copy of patient's insurance/ prescription drug card and patient face sheet must be sent along with the prescription(s)  4. Cost of Rx cannot be added to hospital bill. If financial assistance is needed, please contact unit  or ;  or  CANNOT provide pharmacy voucher for patients co-pays  5.  Patients can then  the prescription on their way out of the hospital at discharge, or pharmacy can deliver to the bedside if staff is available. (payment due at time of pick-up or delivery - cash, check, or card accepted)     Able to afford home medications/ co-pay costs: Yes    ADLS:  Current PT AM-PAC Score: 20 /24  Current OT AM-PAC Score: 18 /24      DISCHARGE Disposition: Home with Home Health Care: [facility]     LOC at discharge: Not Applicable  DONTE Completed: No, Not Indicated    Notification completed in HENS/PAS?:  Not Applicable    IMM Completed:   Yes, Case management has presented and reviewed IMM letter #2 to the patient and/or family/ POA. Patient and/or family/POA verbalized understanding of their medicare rights and appeal process if needed. Patient and/or family/POA has signed, initialed and placed today's date ([phone]) and time ([phone]) on IMM letter #2 on the the appropriate lines. Patient and/or family/POA, copy of letter offered and they are aware that this original copy of IMM letter #2 is available prior to discharge from the paper chart on the unit. Electronic documentation has been entered into epic for IMM letter #2 and original paper copy has been added to the paper chart at the nurses station.      Transportation:  Transportation PLAN for discharge: family   Mode of Transport: Private Car  Reason for medical transport: Not Applicable  Name of [address] [place] [address], [address]: Not Applicable  Time of Transport: 1430    Transport form completed: No, Not Indicated    Home Care:  [address] ordered at discharge: Yes  [address]: [facility]  Phone: [phone]  Fax: [phone]  Orders faxed: Yes    Durable Medical Equipment:  DME Provider: none  Equipment obtained during hospitalization:     Home Oxygen and Respiratory Equipment:  Oxygen needed at discharge?: No, Not [address]: Not Applicable  Portable tank available for discharge?: No, Not Indicated    Dialysis:  Dialysis patient: No    Dialysis Center:  Not Applicable    Hospice Services:  Location: Not Applicable  Agency: Not Applicable    Consents signed: No, Not Indicated    Referrals made at [facility] for outpatient continued care:  Not Applicable    Additional CM Notes: Patient medically stable for DC, plan is for DC home with son until ready to return to her own home. Patient is agreeable to [facility] at AL, [facility] able to accept for new [facility] needs at AL. The Plan for Transition of Care is related to the following treatment goals of Syncope and collapse [R55]  Hypokalemia [E87.6]  LBBB (left bundle branch block) [I44.7]  NSTEMI (non-ST elevated myocardial infarction) ([facility] [address] [I21.4]  Urinary tract infection without hematuria, site unspecified [N39.0]  Syncope, unspecified syncope type [R55]    The Patient and/or patient representative Tiny and her family were provided with a choice of provider and agrees with the discharge plan Yes    Freedom of choice list was provided with basic dialogue that supports the patient's individualized plan of care/goals and shares the quality data associated with the providers.  Yes    Care Transitions patient: No    Mary Wolff RN  [facility] [employer]  Case Management Department  Ph: [phone]  Fax: [phone]

## 2022-10-14 NOTE — DISCHARGE SUMMARY
Hospital Medicine Discharge Summary    Patient ID: Rory Norris      Patient's PCP: No primary care provider on file. Admit Date: 10/11/2022     Discharge Date:   10/14/2022    Admitting Physician: Shala Mayer MD     Discharge Physician: Julisa Segundo MD    Discharge Diagnoses: Active Hospital Problems    Diagnosis Date Noted    Urinary tract infection without hematuria [N39.0] 10/14/2022     Priority: Medium    Current moderate episode of major depressive disorder ([facility] [address] [F32.1] 10/14/2022     Priority: Medium    Fall [W19. XXXA] 10/12/2022     Priority: Medium    LBBB (left bundle branch block) [I44.7] 10/12/2022     Priority: Medium       The patient was seen and examined on day of discharge and this discharge summary is in conjunction with any daily progress note from day of discharge. Hospital Course: 80 y.o. female who presents from [facility]/psychiatric facility for further evaluation of falls/syncope. This patient has been admitted to [facility] as she was found wandering in the streets and has been running away from her son. Patient has history of dementia at baseline. Patient has had a fall with loss of consciousness-once yesterday at the facility. Patient had another fall, which is described as nonmechanical per facility staff-once without loss of consciousness. Patient has not had any head injury during these 2 falls as she was held. She does not take any antiplatelets or blood thinners. During hospital stay she continues to be pleasantly confused as she reports living alone but having significant difficulty keeping track of things the last two months. LBBB found on EKG with no comparison, no concerning findings on echo, ok from EP perspective. She was found to have UTI on admission and antibiotics were started.  Psychiatry was consulted and they felt she had no signs of neurocognitive disease and that the confusion and memory problems were related to said UTI. She experienced some agitation overnight but was redirectable. This morning she is stable and on oral antibiotics, she is appropriate for discharge home with son. Exam:     BP (!) 149/68   Pulse 61   Temp 98.9 °F (37.2 °C) (Oral)   Resp 17   Ht 5' 2\" (1.575 m)   Wt 130 lb 1.1 oz (59 kg)   SpO2 96%   BMI 23.79 kg/m²     General appearance: No apparent distress, cooperative. HEENT: Pupils equal, round, and reactive to light. Conjunctivae/corneas clear. Neck: Supple, with full range of motion. No jugular venous distention. Trachea midline. Respiratory:  Normal respiratory effort. Clear to auscultation, bilaterally without Rales/Wheezes/Rhonchi. Cardiovascular: Regular rate and rhythm with normal S1/S2 without murmurs, rubs or gallops. Abdomen: Soft, non-tender, non-distended with normal bowel sounds. Musculoskelatal: No clubbing, cyanosis or edema bilaterally. Full range of motion without deformity. Skin: Skin color, texture, turgor normal.  No rashes or lesions. Neurologic:  Neurovascularly intact without any focal sensory/motor deficits. Cranial nerves: II-XII intact, grossly non-focal.  Psychiatric: A&O x2, pleasantly confused      Consults:     IP CONSULT TO PHARMACY  IP CONSULT TO CARDIOLOGY  IP CONSULT TO SOCIAL WORK  IP CONSULT TO PSYCHIATRY    Significant Diagnostic Studies:     ECHO 10/11/22   Left ventricular cavity size is normal. Normal left ventricular wall   thickness. Ejection fraction is visually estimated to be 60-65%. No regional   wall motion abnormalities are noted. Indeterminate diastolic function. Mild mitral regurgitation is present. Mild tricuspid regurgitation. No evidence of aortic valve regurgitation or stenosis. CT Head 10/11/22   No acute intercranial hemorrhage or mass effect.    Mild chronic small vessel ischemic change    PCP/SNF to follow up: PRN    Disposition:  Home with son     Discharge Instructions/Follow-up:  Take medications as directed below, follow up with PCP as needed    Code Status:  Full Code     Activity: activity as tolerated    Diet: regular diet    Labs: For convenience and continuity at follow-up the following most recent labs are provided:      CBC:    Lab Results   Component Value Date/Time    WBC 6.8 10/12/2022 05:05 AM    HGB 12.9 10/12/2022 05:05 AM    HCT 38.0 10/12/2022 05:05 AM     10/12/2022 05:05 AM       Renal:    Lab Results   Component Value Date/Time     10/12/2022 05:05 AM    K 4.0 10/12/2022 05:05 AM    CL 99 10/12/2022 05:05 AM    CO2 25 10/12/2022 05:05 AM    BUN 13 10/12/2022 05:05 AM    CREATININE 0.5 10/12/2022 05:05 AM    CALCIUM 9.7 10/12/2022 05:05 AM       Discharge Medications:     Current Discharge Medication List             Details   cefUROXime (CEFTIN) 250 MG tablet Take 1 tablet by mouth every 12 hours for 5 days  Qty: 10 tablet, Refills: 0                Details   levothyroxine (SYNTHROID) 50 MCG tablet Take 50 mcg by mouth Daily      lisinopril (PRINIVIL;ZESTRIL) 40 MG tablet Take 40 mg by mouth daily      DULoxetine (CYMBALTA) 30 MG extended release capsule Take 30 mg by mouth every evening      acetaminophen (TYLENOL) 325 MG tablet Take 650 mg by mouth every 6 hours as needed for Pain      aluminum & magnesium hydroxide-simethicone (MAALOX) 200-200-20 MG/5ML SUSP suspension Take 30 mLs by mouth every 4 hours as needed for Indigestion      traZODone (DESYREL) 50 MG tablet Take 25 mg by mouth nightly as needed for Sleep      labetalol (NORMODYNE) 200 MG tablet Take 100 mg by mouth 2 times daily      amLODIPine (NORVASC) 10 MG tablet Take 10 mg by mouth daily      atorvastatin (LIPITOR) 40 MG tablet Take 40 mg by mouth at bedtime             Time Spent on discharge is more than 30 minutes in the examination, evaluation, counseling and review of medications and discharge plan.       Signed:    Marlon Lane   10/14/2022

## 2022-10-14 NOTE — PROGRESS NOTES
Pt pulled IV line twice and frequent episodes of getting out of bed. Reinserted IV access, reoriented pt, safety precautions reinforced, on video monitor.

## 2022-10-18 NOTE — PROGRESS NOTES
Physician Progress Note      PATIENT:               Sherryll Prader  CSN #:                  [id number]  :                       1940  ADMIT DATE:       10/11/2022 1:52 PM  [address] [place] DATE:        10/14/2022 2:20 PM  RESPONDING  PROVIDER #:        Nazia Page TEXT:    Patient admitted with syncope, fall. Noted documentation of NSTEMI in the ED. In order to support the diagnosis of NSTEMI, please refer to 4th universal   definition of MI below and include additional clinical indicators in your   documentation. ? Or please document if the diagnosis of NSTEMI has been ruled   out after study. The medical record reflects the following:  ?? Risk Factors: 79 yo from [facility] w/ UTI, LBBB, falls  ? ? Clinical Indicators: Per ED: NSTEMI (non-ST elevated myocardial   infarction). Negative for chest pain. Per Cardiology: On admission EKG   showed left bundle branch block, unable to determine if this is chronic or   new. Troponin was trivially elevated at 0.03 and then trended down. ?? Treatment: Cardiology consult, ECHO, EKG, Trend troponins    Fourth Universal Definition of Myocardial Infarction:  Clearly separates MI   from myocardial injury. Patients with elevated blood troponin levels but   without clinical evidence of ischemia are said to have had a myocardial   injury. ? To have a myocardial infarction requires both an elevated troponin   blood test along with at least one of the following:  - Symptoms of acute myocardial ischemia (Types 1 - 5 MI)  - Clinical evidence of ischemia, as evidenced in an electrocardiogram (EKG)   showing new ischemic changes (Type 1, Type 2, Type 3, or Type 4a MI)  - Development of pathological Q waves (Types 1 - 5 MI)  - Imaging evidence of new loss of viable myocardium or new regional wall   motion abnormality in a pattern consistent with an ischemic etiology (Types 1   - 5 MI)  Options provided:  -- NSTEMI ruled out after study and demand ischemia confirmed  -- NSTEMI present as evidenced by, Please document indicators of myocardial   infarction. -- Other - I will add my own diagnosis  -- Disagree - Not applicable / Not valid  -- Disagree - Clinically unable to determine / Unknown  -- Refer to Clinical Documentation Reviewer    PROVIDER RESPONSE TEXT:    NSTEMI was ruled out after study and demand ischemia confirmed.     Query created by: Vijaya Lopez on 10/18/2022 6:12 AM      Electronically signed by:  Jerry Cannon 10/18/2022 12:18 PM
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
## 2023-05-04 NOTE — PROGRESS NOTES
Electrophysiology - PROGRESS NOTE    Admit Date: 10/11/2022     Chief Complaint: LBBB, poss syncope     Interval History:   Patient seen and examined and notes reviewed. Patient is being followed for LBBB. No acute events overnight. Remains NSR on tele, HR controlled. Pt denies palpitations, heart racing, dizziness, lightheadedness. No CP, SOB, PND, orthopnea, BLE edema. BP well controlled.      In: 80 [P.O.:80]  Out: 1    Wt Readings from Last 2 Encounters:   10/13/22 129 lb 3 oz (58.6 kg)         Data:   Scheduled Meds:   Scheduled Meds:   amLODIPine  10 mg Oral Daily    atorvastatin  40 mg Oral Nightly    DULoxetine  30 mg Oral QPM    labetalol  100 mg Oral BID    levothyroxine  50 mcg Oral Daily    lisinopril  40 mg Oral Daily    [Held by provider] risperiDONE  0.25 mg Oral BID    sodium chloride flush  5-40 mL IntraVENous 2 times per day    enoxaparin  40 mg SubCUTAneous Daily    cefTRIAXone (ROCEPHIN) IV  1,000 mg IntraVENous Q24H     Continuous Infusions:   sodium chloride       PRN Meds:.perflutren lipid microspheres, traZODone, sodium chloride flush, sodium chloride, ondansetron **OR** ondansetron, polyethylene glycol, acetaminophen **OR** acetaminophen, potassium chloride **OR** potassium alternative oral replacement **OR** potassium chloride, magnesium sulfate  Continuous Infusions:   sodium chloride         Intake/Output Summary (Last 24 hours) at 10/13/2022 0919  Last data filed at 10/12/2022 2100  Gross per 24 hour   Intake 80 ml   Output 1 ml   Net 79 ml       CBC:   Lab Results   Component Value Date/Time    WBC 6.8 10/12/2022 05:05 AM    HGB 12.9 10/12/2022 05:05 AM     10/12/2022 05:05 AM     BMP:  Lab Results   Component Value Date/Time     10/12/2022 05:05 AM    K 4.0 10/12/2022 05:05 AM    CL 99 10/12/2022 05:05 AM    CO2 25 10/12/2022 05:05 AM    BUN 13 10/12/2022 05:05 AM    CREATININE 0.5 10/12/2022 05:05 AM    GLUCOSE 108 10/12/2022 05:05 AM     INR: No results found for: INR     CARDIAC LABS  ENZYMES:  Recent Labs     10/11/22  1635 10/11/22  2351 10/12/22  0505   TROPONINI 0.03* 0.02* 0.01     FASTING LIPID PANEL:No results found for: HDL, LDLDIRECT, LDLCALC, TRIG, TSH  LIVER PROFILE:  Lab Results   Component Value Date/Time    AST 31 10/11/2022 03:29 PM    ALT 53 10/11/2022 03:29 PM       -----------------------------------------------------------------  Telemetry: Personally reviewed  NSR    Objective:   Vitals: /74   Pulse 62   Temp 98 °F (36.7 °C) (Oral)   Resp 16   Ht 5' 2\" (1.575 m)   Wt 129 lb 3 oz (58.6 kg)   SpO2 95%   BMI 23.63 kg/m²   General appearance: alert, appears stated age and cooperative, No acute distress   Skin: Skin color, texture, turgor normal. No rashes or ecchymosis. Neck: no JVD, supple, symmetrical, trachea midline   Lungs: , no accessory muscle use, no respiratory distress  Heart: RRR  Extremities: No edema, DP +  Psychiatric: normal insight and affect    Patient Active Problem List:     Syncope, unspecified syncope type     Fall     LBBB (left bundle branch block)        Assessment & Plan:    Fall  Dementia  LBBB  UTI    Tiny T Rasheeda Fuentes Is a 80 y.o. woman w/ PMH dementia, HTN, hypothyroidism who presented after multiple falls at nursing facility, noted to have LBBB on EKG     LBBB/ ? syncope  - LBBB noted on EKG on admission, no prior to EKG to compare to  - Pt did not have syncopal event per NH report- had mechanical fall  - Echo pending  - If echo wnl, EP will sign off  - Keep on tele  - Keep K>4.0, Mg >2.0      Electronically signed by BITA Frey CNP on 10/13/22 at 9:19 AM EDT    [address]    I spent a total of 35 minutes and greater than 50% of the time was spent counseling with Bob Víctorgris and coordinating care regarding her diagnosis, treatments and plan of care. No